# Patient Record
Sex: FEMALE | Race: WHITE | NOT HISPANIC OR LATINO | Employment: FULL TIME | URBAN - NONMETROPOLITAN AREA
[De-identification: names, ages, dates, MRNs, and addresses within clinical notes are randomized per-mention and may not be internally consistent; named-entity substitution may affect disease eponyms.]

---
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
## 2023-10-14 ENCOUNTER — HOSPITAL ENCOUNTER (EMERGENCY)
Facility: HOSPITAL | Age: 18
Discharge: HOME | End: 2023-10-14
Attending: EMERGENCY MEDICINE
Payer: COMMERCIAL

## 2023-10-14 ENCOUNTER — APPOINTMENT (OUTPATIENT)
Dept: ULTRASOUND IMAGING | Facility: HOSPITAL | Age: 18
End: 2023-10-14
Payer: COMMERCIAL

## 2023-10-14 VITALS
OXYGEN SATURATION: 97 % | BODY MASS INDEX: 32.96 KG/M2 | RESPIRATION RATE: 18 BRPM | HEART RATE: 76 BPM | HEIGHT: 63 IN | TEMPERATURE: 98 F | SYSTOLIC BLOOD PRESSURE: 115 MMHG | DIASTOLIC BLOOD PRESSURE: 76 MMHG | WEIGHT: 186 LBS

## 2023-10-14 DIAGNOSIS — R10.9 ABDOMINAL CRAMPING: ICD-10-CM

## 2023-10-14 DIAGNOSIS — Z34.90 INTRAUTERINE PREGNANCY: Primary | ICD-10-CM

## 2023-10-14 LAB
ALBUMIN SERPL-MCNC: 4.2 G/DL (ref 3.5–5.2)
ALBUMIN/GLOB SERPL: 1.4 G/DL
ALP SERPL-CCNC: 71 U/L (ref 39–117)
ALT SERPL W P-5'-P-CCNC: 18 U/L (ref 1–33)
ANION GAP SERPL CALCULATED.3IONS-SCNC: 12.7 MMOL/L (ref 5–15)
AST SERPL-CCNC: 17 U/L (ref 1–32)
B-HCG UR QL: NEGATIVE
BASOPHILS # BLD AUTO: 0.02 10*3/MM3 (ref 0–0.2)
BASOPHILS NFR BLD AUTO: 0.2 % (ref 0–1.5)
BILIRUB SERPL-MCNC: 1.1 MG/DL (ref 0–1.2)
BILIRUB UR QL STRIP: NEGATIVE
BUN SERPL-MCNC: 11 MG/DL (ref 6–20)
BUN/CREAT SERPL: 16.9 (ref 7–25)
CALCIUM SPEC-SCNC: 9.7 MG/DL (ref 8.6–10.5)
CHLORIDE SERPL-SCNC: 103 MMOL/L (ref 98–107)
CLARITY UR: ABNORMAL
CO2 SERPL-SCNC: 22.3 MMOL/L (ref 22–29)
COLOR UR: YELLOW
CREAT SERPL-MCNC: 0.65 MG/DL (ref 0.57–1)
DEPRECATED RDW RBC AUTO: 38.5 FL (ref 37–54)
EGFRCR SERPLBLD CKD-EPI 2021: 130.3 ML/MIN/1.73
EOSINOPHIL # BLD AUTO: 0.18 10*3/MM3 (ref 0–0.4)
EOSINOPHIL NFR BLD AUTO: 1.8 % (ref 0.3–6.2)
ERYTHROCYTE [DISTWIDTH] IN BLOOD BY AUTOMATED COUNT: 12 % (ref 12.3–15.4)
GLOBULIN UR ELPH-MCNC: 3 GM/DL
GLUCOSE SERPL-MCNC: 87 MG/DL (ref 65–99)
GLUCOSE UR STRIP-MCNC: NEGATIVE MG/DL
HCG INTACT+B SERPL-ACNC: 3092 MIU/ML
HCT VFR BLD AUTO: 39.3 % (ref 34–46.6)
HGB BLD-MCNC: 13.1 G/DL (ref 12–15.9)
HGB UR QL STRIP.AUTO: NEGATIVE
IMM GRANULOCYTES # BLD AUTO: 0.03 10*3/MM3 (ref 0–0.05)
IMM GRANULOCYTES NFR BLD AUTO: 0.3 % (ref 0–0.5)
KETONES UR QL STRIP: NEGATIVE
LEUKOCYTE ESTERASE UR QL STRIP.AUTO: NEGATIVE
LYMPHOCYTES # BLD AUTO: 2.81 10*3/MM3 (ref 0.7–3.1)
LYMPHOCYTES NFR BLD AUTO: 28.6 % (ref 19.6–45.3)
MCH RBC QN AUTO: 29.4 PG (ref 26.6–33)
MCHC RBC AUTO-ENTMCNC: 33.3 G/DL (ref 31.5–35.7)
MCV RBC AUTO: 88.1 FL (ref 79–97)
MONOCYTES # BLD AUTO: 0.56 10*3/MM3 (ref 0.1–0.9)
MONOCYTES NFR BLD AUTO: 5.7 % (ref 5–12)
NEUTROPHILS NFR BLD AUTO: 6.21 10*3/MM3 (ref 1.7–7)
NEUTROPHILS NFR BLD AUTO: 63.4 % (ref 42.7–76)
NITRITE UR QL STRIP: NEGATIVE
NRBC BLD AUTO-RTO: 0 /100 WBC (ref 0–0.2)
PH UR STRIP.AUTO: 7 [PH] (ref 5–8)
PLATELET # BLD AUTO: 304 10*3/MM3 (ref 140–450)
PMV BLD AUTO: 9.5 FL (ref 6–12)
POTASSIUM SERPL-SCNC: 3.7 MMOL/L (ref 3.5–5.2)
PROT SERPL-MCNC: 7.2 G/DL (ref 6–8.5)
PROT UR QL STRIP: NEGATIVE
RBC # BLD AUTO: 4.46 10*6/MM3 (ref 3.77–5.28)
SODIUM SERPL-SCNC: 138 MMOL/L (ref 136–145)
SP GR UR STRIP: 1.03 (ref 1–1.03)
UROBILINOGEN UR QL STRIP: ABNORMAL
WBC NRBC COR # BLD: 9.81 10*3/MM3 (ref 3.4–10.8)

## 2023-10-14 PROCEDURE — 81003 URINALYSIS AUTO W/O SCOPE: CPT | Performed by: PHYSICIAN ASSISTANT

## 2023-10-14 PROCEDURE — 81025 URINE PREGNANCY TEST: CPT | Performed by: PHYSICIAN ASSISTANT

## 2023-10-14 PROCEDURE — 99284 EMERGENCY DEPT VISIT MOD MDM: CPT

## 2023-10-14 PROCEDURE — 85025 COMPLETE CBC W/AUTO DIFF WBC: CPT | Performed by: PHYSICIAN ASSISTANT

## 2023-10-14 PROCEDURE — 76817 TRANSVAGINAL US OBSTETRIC: CPT

## 2023-10-14 PROCEDURE — 84702 CHORIONIC GONADOTROPIN TEST: CPT | Performed by: PHYSICIAN ASSISTANT

## 2023-10-14 PROCEDURE — 36415 COLL VENOUS BLD VENIPUNCTURE: CPT

## 2023-10-14 PROCEDURE — 80053 COMPREHEN METABOLIC PANEL: CPT | Performed by: PHYSICIAN ASSISTANT

## 2023-10-14 RX ORDER — PRENATAL VIT NO.126/IRON/FOLIC 28MG-0.8MG
1 TABLET ORAL DAILY
COMMUNITY

## 2023-10-14 NOTE — ED PROVIDER NOTES
"Subjective  History of Present Illness:    Chief Complaint: Abdominal Cramping   History of Present Illness: Patient is a 17-year-old G1, P0 female who believes she may be approximately 6 weeks gestation with history of asthma and Kathya-Parkinson-White syndrome presenting to the ER for evaluation of abdominal cramping.  Patient reports her last period was September 12, 2023, had blood work performed that showed that she may be around 6 weeks pregnant.  She states for the past couple of days she has had diffuse lower abdominal cramping without vaginal bleeding.  She denies any fever, chills, vaginal discharge, dysuria, hematuria, diarrhea or change in bowel movements, dizziness, syncope, or any other symptoms.  She has not seen OB/GYN with this pregnancy yet.  Onset: Few days  Duration: Persistent   Exacerbating / Alleviating factors: None  Associated symptoms: None      Nurses Notes reviewed and agree, including vitals, allergies, social history and prior medical history.     REVIEW OF SYSTEMS: All systems reviewed and not pertinent unless noted.  Review of Systems      Positive for: Abdominal cramping     Negative for: Vaginal bleeding, fever, chills, dysuria, hematuria, dizziness, syncope    Past Medical History:   Diagnosis Date    Asthma     Kathya-Parkinson-White syndrome        Allergies:    Patient has no known allergies.      History reviewed. No pertinent surgical history.      Social History     Socioeconomic History    Marital status: Single   Tobacco Use    Smoking status: Never   Substance and Sexual Activity    Alcohol use: Never    Drug use: Never         History reviewed. No pertinent family history.    Objective  Physical Exam:  /76 (BP Location: Left arm, Patient Position: Sitting)   Pulse 76   Temp 98 øF (36.7 øC) (Oral)   Resp 18   Ht 160 cm (63\")   Wt 84.4 kg (186 lb)   LMP 09/13/2023 (Exact Date)   SpO2 97%   BMI 32.95 kg/mý      Physical Exam    CONSTITUTIONAL: Well developed, no " acute distress, nontoxic in appearance  VITAL SIGNS: per nursing, reviewed and noted  SKIN: exposed skin with no rashes, ulcerations or petechiae  EYES: Grossly EOMI, no icterus, PERRL  ENT: Normal voice.  Nares patent, no facial asymmetry   RESPIRATORY:  No increased work of breathing. No retractions. Lung sounds clear to auscultation   CARDIOVASCULAR:  regular rate and rhythm, distal pulses intact  GI: Abdomen soft, no significant tenderness or guarding with palpation  MUSCULOSKELETAL:  No tenderness. Full ROM. Strength and tone grossly normal.  no spasms.  NEUROLOGIC: Alert, oriented x 3. No gross deficits. GCS 15.   PSYCH: appropriate affect.  : Deferred    Procedures    ED Course:        ED Course as of 10/14/23 2248   Sat Oct 14, 2023   1541 WBC: 9.81 [LR]   1541 Hemoglobin: 13.1 [LR]   1554 HCG, Urine QL: Negative [LR]   1555 Glucose: 87 [LR]   1555 BUN: 11 [LR]   1555 Creatinine: 0.65 [LR]   1555 Sodium: 138 [LR]   1555 Potassium: 3.7 [LR]   1555 Chloride: 103 [LR]   1555 Calcium: 9.7 [LR]   1555 Total Protein: 7.2 [LR]   1555 Albumin: 4.2 [LR]   1555 ALT (SGPT): 18 [LR]   1555 AST (SGOT): 17 [LR]   1555 Alkaline Phosphatase: 71 [LR]   1555 Total Bilirubin: 1.1 [LR]   1555 Globulin: 3.0 [LR]   1555 A/G Ratio: 1.4 [LR]   1555 BUN/Creatinine Ratio: 16.9 [LR]   1555 Anion Gap: 12.7 [LR]   1555 eGFR: 130.3 [LR]   1555 Color, UA: Yellow [LR]   1555 Appearance, UA(!): Cloudy [LR]   1555 pH, UA: 7.0 [LR]   1555 Specific Gravity, UA: 1.027 [LR]   1555 Glucose: Negative [LR]   1555 Ketones, UA: Negative [LR]   1555 Bilirubin, UA: Negative [LR]   1555 Blood, UA: Negative [LR]   1555 Protein, UA: Negative [LR]   1555 Leukocytes, UA: Negative [LR]   1555 Nitrite, UA: Negative [LR]   1555 Urobilinogen, UA: 1.0 E.U./dL [LR]   1659 HCG Quantitative: 3,092.00 [LR]   1848 Ultrasound was read by radiologist as 4 weeks 6-day intrauterine gestation [LR]      ED Course User Index  [LR] Siena Lopez PA-C       Lab Results  (last 24 hours)       Procedure Component Value Units Date/Time    CBC & Differential [[id number]]  (Abnormal) Collected: 10/14/23 1532    Specimen: Blood Updated: 10/14/23 1538    Narrative:      The following orders were created for panel order CBC & Differential.  Procedure                               Abnormality         Status                     ---------                               -----------         ------                     CBC Auto Differential[[id number]]        Abnormal            Final result                 Please view results for these tests on the individual orders.    Comprehensive Metabolic Panel [[id number]] Collected: 10/14/23 1532    Specimen: Blood Updated: 10/14/23 1553     Glucose 87 mg/dL      BUN 11 mg/dL      Creatinine 0.65 mg/dL      Sodium 138 mmol/L      Potassium 3.7 mmol/L      Chloride 103 mmol/L      CO2 22.3 mmol/L      Calcium 9.7 mg/dL      Total Protein 7.2 g/dL      Albumin 4.2 g/dL      ALT (SGPT) 18 U/L      AST (SGOT) 17 U/L      Alkaline Phosphatase 71 U/L      Total Bilirubin 1.1 mg/dL      Globulin 3.0 gm/dL      A/G Ratio 1.4 g/dL      BUN/Creatinine Ratio 16.9     Anion Gap 12.7 mmol/L      eGFR 130.3 mL/min/1.73     Narrative:      GFR Normal >60  Chronic Kidney Disease <60  Kidney Failure <15      hCG, Quantitative, Pregnancy [[id number]] Collected: 10/14/23 1532    Specimen: Blood Updated: 10/14/23 1658     HCG Quantitative 3,092.00 mIU/mL     Narrative:      HCG Ranges by Gestational Age    Females - non-pregnant premenopausal   </= 1mIU/mL HCG  Females - postmenopausal               </= 7mIU/mL HCG    3 Weeks         5.8 -    71.2 mIU/mL  4 Weeks         9.5 -     750 mIU/mL  5 Weeks         217 -   7,138 mIU/mL  6 Weeks         158 -  31,795 mIU/mL  7 Weeks       3,697 - 163,563 mIU/mL  8 Weeks      32,065 - 149,571 mIU/mL  9 Weeks      63,803 - 151,410 mIU/mL  10 Weeks     46,509 - 186,977 mIU/mL  12 Weeks     27,832 - 210,612 mIU/mL  14 Weeks     13,950 -   62,530 mIU/mL  15 Weeks     12,039 -  70,971 mIU/mL  16 Weeks      9,040 -  56,451 mIU/mL  17 Weeks      8,175 -  55,868 mIU/mL  18 Weeks      8,099 -  58,176 mIU/mL    CBC Auto Differential [[id number]]  (Abnormal) Collected: 10/14/23 1532    Specimen: Blood Updated: 10/14/23 1538     WBC 9.81 10*3/mm3      RBC 4.46 10*6/mm3      Hemoglobin 13.1 g/dL      Hematocrit 39.3 %      MCV 88.1 fL      MCH 29.4 pg      MCHC 33.3 g/dL      RDW 12.0 %      RDW-SD 38.5 fl      MPV 9.5 fL      Platelets 304 10*3/mm3      Neutrophil % 63.4 %      Lymphocyte % 28.6 %      Monocyte % 5.7 %      Eosinophil % 1.8 %      Basophil % 0.2 %      Immature Grans % 0.3 %      Neutrophils, Absolute 6.21 10*3/mm3      Lymphocytes, Absolute 2.81 10*3/mm3      Monocytes, Absolute 0.56 10*3/mm3      Eosinophils, Absolute 0.18 10*3/mm3      Basophils, Absolute 0.02 10*3/mm3      Immature Grans, Absolute 0.03 10*3/mm3      nRBC 0.0 /100 WBC     Pregnancy, Urine - Urine, Clean Catch [[id number]]  (Normal) Collected: 10/14/23 1538    Specimen: Urine, Clean Catch Updated: 10/14/23 1548     HCG, Urine QL Negative    Urinalysis With Microscopic If Indicated (No Culture) - Urine, Clean Catch [[id number]]  (Abnormal) Collected: 10/14/23 1538    Specimen: Urine, Clean Catch Updated: 10/14/23 1545     Color, UA Yellow     Appearance, UA Cloudy     pH, UA 7.0     Specific Gravity, UA 1.027     Glucose, UA Negative     Ketones, UA Negative     Bilirubin, UA Negative     Blood, UA Negative     Protein, UA Negative     Leuk Esterase, UA Negative     Nitrite, UA Negative     Urobilinogen, UA 1.0 E.U./dL    Narrative:      Urine microscopic not indicated.             US Ob Transvaginal    Result Date: 10/14/2023  FINAL REPORT TECHNIQUE: Sonographic images of the pelvis were obtained transvaginally. CLINICAL HISTORY: Abdominal cramping, positive hCG test FINDINGS: There is an intrauterine gestational sac with a mean sac diameter of 4 mm.  There is a fetal pole  with a crown-rump length measuring 1.3 mm corresponding to a gestational age of 4 weeks, 6 days.  No fetal cardiac activity is visualized.  There is a physiologic volume of free fluid in the pelvis.  There are no suspicious adnexal lesions.     Impression: 4 weeks, 6 days intrauterine gestation.  Lack of fetal cardiac activity is likely secondary to very early pregnancy.  Continued follow-up is clinically warranted. Authenticated and Electronically Signed by Cody Conklin MD on 10/14/2023 07:02:12 PM        [facility]     Amount and/or Complexity of Data Reviewed  Clinical lab tests: reviewed        Initial impression of presenting illness: Patient is a  female who believes she may be about 6 weeks gestation presenting to the ER for evaluation of abdominal cramping    DDX: includes but is not limited to: Intrauterine pregnancy, ectopic pregnancy, urinary tract infection, and other concerns.    Patient arrives in overall stable condition with vitals interpreted by myself.     Pertinent features from physical exam: Vital signs are stable, no significant tenderness or guarding on exam    Initial diagnostic plan: We will obtain urinalysis, UPT, hCG level and basic labs.    Results from initial plan were reviewed and interpreted by me revealing Stable work-up.  UPT was negative but patient's hCG level was 3092.  Urine had no signs of infection.  CBC stable, CMP without abnormalities.  Ultrasound was read by radiologist as a 4-week 6-day intrauterine gestation    Diagnostic information from other sources: Chart review    Interventions / Re-evaluation: Patient remained stable throughout visit    Results/clinical rationale were discussed with patient.  Discussed that she will need continued follow-up with OB/GYN.  Discussed return precautions to the ER.  She verbalized understanding and was in agreement with this plan of care    Consultations/Discussion of results with other physicians: None    Disposition plan:  Discharge  -----    Final diagnoses:   Intrauterine pregnancy   Abdominal cramping          Siena Lopez PA-C  10/14/23 0722

## 2023-10-14 NOTE — DISCHARGE INSTRUCTIONS
Your blood work and urine were stable.  Ultrasound revealed an intrauterine gestation approximately 4 weeks 6 days, no cardiac activity identified at this time but likely related to the early gestation.  You will need to follow-up closely with your primary care provider and OB/GYN as scheduled.  You can take Tylenol as needed for pain, continue prenatal vitamins.  Avoid NSAIDs such as ibuprofen, Motrin or Aleve.  Return to the ER for any change in worsening symptoms, or any additional concerns including but not limited to severe or worsening abdominal pain, heavy vaginal bleeding with dizziness or syncope, fever greater than 100.4.

## 2023-11-17 ENCOUNTER — INITIAL PRENATAL (OUTPATIENT)
Dept: OBSTETRICS AND GYNECOLOGY | Facility: CLINIC | Age: 18
End: 2023-11-17
Payer: COMMERCIAL

## 2023-11-17 VITALS — BODY MASS INDEX: 32.52 KG/M2 | SYSTOLIC BLOOD PRESSURE: 118 MMHG | DIASTOLIC BLOOD PRESSURE: 68 MMHG | WEIGHT: 183.6 LBS

## 2023-11-17 DIAGNOSIS — Z34.90 PREGNANCY, UNSPECIFIED GESTATIONAL AGE: Primary | ICD-10-CM

## 2023-11-17 RX ORDER — EPINEPHRINE 0.3 MG/.3ML
INJECTION SUBCUTANEOUS
COMMUNITY
Start: 2023-08-14

## 2023-11-17 NOTE — PROGRESS NOTES
Subjective   Chief Complaint   Patient presents with    Initial Prenatal Visit     New OB visit with TVS,UDS and cultures done today. No problems or concerns.     Jaye Simon is a 18 y.o. year old .  Patient's last menstrual period was 2023 (exact date).  She presents to be seen because of NOB.     OTHER COMPLAINTS:  WPW-- ablation in [place] this past year    The following portions of the patient's history were reviewed and updated as appropriate:She  has a past medical history of Abnormal ECG, Anxiety, Asthma, Coronary artery disease, Depression (2019), PMS (premenstrual syndrome), PONV (postoperative nausea and vomiting), Urinary tract infection, and Kathya-Parkinson-White syndrome.  She does not have a problem list on file.  She  has a past surgical history that includes [facility] tooth extraction (21).  Her family history includes Breast cancer in her maternal grandmother; Diabetes in her maternal grandfather; Hypertension in her father; Ovarian cancer in her maternal grandmother; Prostate cancer in her father; Stroke in her sister; Uterine cancer in her maternal aunt.  She  reports that she has quit smoking. Her smoking use included cigarettes. She has a 0.13 pack-year smoking history. She does not have any smokeless tobacco history on file. She reports that she does not currently use alcohol. She reports that she does not use drugs.  Current Outpatient Medications   Medication Sig Dispense Refill    EPINEPHrine (EPIPEN) 0.3 MG/0.3ML solution auto-injector injection AS DIRECTED FOR anaphylaxis      prenatal vitamin (prenatal, CLASSIC, vitamin) tablet Take 1 tablet by mouth Daily.      FLUoxetine (PROzac) 10 MG capsule Take 1 capsule by mouth Daily.      fluticasone (FLONASE) 50 MCG/ACT nasal spray 2 sprays into the nostril(s) as directed by provider Daily. 15.8 mL 0    ondansetron ODT (ZOFRAN-ODT) 4 MG disintegrating tablet Place 1 tablet on the tongue Every 6 (Six) Hours As Needed for Nausea or  Vomiting. 20 tablet 0     No current facility-administered medications for this visit.     Current Outpatient Medications on File Prior to Visit   Medication Sig    EPINEPHrine (EPIPEN) 0.3 MG/0.3ML solution auto-injector injection AS DIRECTED FOR anaphylaxis    prenatal vitamin (prenatal, CLASSIC, vitamin) tablet Take 1 tablet by mouth Daily.    FLUoxetine (PROzac) 10 MG capsule Take 1 capsule by mouth Daily.    fluticasone (FLONASE) 50 MCG/ACT nasal spray 2 sprays into the nostril(s) as directed by provider Daily.    ondansetron ODT (ZOFRAN-ODT) 4 MG disintegrating tablet Place 1 tablet on the tongue Every 6 (Six) Hours As Needed for Nausea or Vomiting.     No current facility-administered medications on file prior to visit.     She has No Known Allergies.    Social History    Tobacco Use      Smoking status: Former        Packs/day: 0.25        Years: 0.50        Additional pack years: 0.00        Total pack years: 0.13        Types: Cigarettes      Smokeless tobacco: Not on file    Review of Systems  Consitutional POS: nothing reported    NEG: anorexia or night sweats   Gastointestinal POS: nothing reported    NEG: bloating, change in bowel habits, melena, or reflux symptoms   Genitourinary POS: nothing reported    NEG: dysuria or hematuria   Integument POS: nothing reported    NEG: moles that are changing in size, shape, color or rashes   Breast POS: nothing reported    NEG: persistent breast lump, skin dimpling, or nipple discharge         Respiratory: negative  Cardiovascular: negative  GYN:  negative          Objective   /68   Wt 83.3 kg (183 lb 9.6 oz)   LMP 09/13/2023 (Exact Date)   BMI 32.52 kg/m²     General:  well developed; well nourished  no acute distress   Skin:  No suspicious lesions seen   Thyroid: normal to inspection and palpation   Lungs:  breathing is unlabored  clear to auscultation bilaterally   Heart:  regular rate and rhythm, S1, S2 normal, no murmur, click, rub or gallop    Breasts:  Examined in supine position  Symmetric without masses or skin dimpling  Nipples normal without inversion, lesions or discharge  There are no palpable axillary nodes   Abdomen: soft, non-tender; no masses  no umbilical or inguinal hernias are present  no hepato-splenomegaly   Pelvis: Clinical staff was present for exam  External genitalia:  normal appearance of the external genitalia including Bartholin's and Tallulah's glands.  :  urethral meatus normal;  Vaginal:  normal pink mucosa without prolapse or lesions.  Cervix:  normal appearance.  Uterus:  normal size, shape and consistency.  Adnexa:  normal bimanual exam of the adnexa.  Rectal:  digital rectal exam not performed; anus visually normal appearing.     Psychiatric: Alert and oriented ×3, mood and affect appropriate  HEENT: Atraumatic, normocephalic, normal scleral icterus  Extremities: 2+ pulses bilaterally, no edema      Lab Review   No data reviewed    Imaging   Viable intrauterine pregnancy 9 weeks 5 days.  Positive cardiac activity.  Normal cervix and adnexa.  No masses.  No free fluid.       Assessment   New OB 9 weeks 2 days.  Size consistent with dates.  DECLAN will be 6/19/2024     Plan   Cultures taken.  Prenatal labs.  Cystic fibrosis testing.  Encourage patient to follow-up at least sometime in this.  Echo was normal last fall  Diet/exercise    No orders of the defined types were placed in this encounter.         This note was electronically signed.      November 17, 2023

## 2023-11-18 LAB
AMPHETAMINES UR QL SCN: NEGATIVE NG/ML
BARBITURATES UR QL SCN: NEGATIVE NG/ML
BENZODIAZ UR QL SCN: NEGATIVE NG/ML
BZE UR QL SCN: NEGATIVE NG/ML
CANNABINOIDS UR QL SCN: NEGATIVE NG/ML
CREAT UR-MCNC: 206.2 MG/DL (ref 20–300)
LABORATORY COMMENT REPORT: NORMAL
METHADONE UR QL SCN: NEGATIVE NG/ML
OPIATES UR QL SCN: NEGATIVE NG/ML
OXYCODONE+OXYMORPHONE UR QL SCN: NEGATIVE NG/ML
PCP UR QL: NEGATIVE NG/ML
PH UR: 6 [PH] (ref 4.5–8.9)
PROPOXYPH UR QL SCN: NEGATIVE NG/ML

## 2023-11-21 LAB
A VAGINAE DNA VAG QL NAA+PROBE: ABNORMAL SCORE
BVAB2 DNA VAG QL NAA+PROBE: ABNORMAL SCORE
C ALBICANS DNA VAG QL NAA+PROBE: NEGATIVE
C GLABRATA DNA VAG QL NAA+PROBE: NEGATIVE
C TRACH DNA VAG QL NAA+PROBE: NEGATIVE
MEGA1 DNA VAG QL NAA+PROBE: ABNORMAL SCORE
N GONORRHOEA DNA VAG QL NAA+PROBE: NEGATIVE
T VAGINALIS DNA VAG QL NAA+PROBE: POSITIVE

## 2023-11-21 RX ORDER — METRONIDAZOLE 500 MG/1
500 TABLET ORAL 2 TIMES DAILY
Qty: 14 TABLET | Refills: 0 | Status: SHIPPED | OUTPATIENT
Start: 2023-11-21 | End: 2023-11-28

## 2023-11-28 LAB
ABO GROUP BLD: NORMAL
BASOPHILS # BLD AUTO: 0 X10E3/UL (ref 0–0.2)
BASOPHILS NFR BLD AUTO: 1 %
BLD GP AB SCN SERPL QL: NEGATIVE
CFTR MUT ANL BLD/T: NORMAL
EOSINOPHIL # BLD AUTO: 0.1 X10E3/UL (ref 0–0.4)
EOSINOPHIL NFR BLD AUTO: 1 %
ERYTHROCYTE [DISTWIDTH] IN BLOOD BY AUTOMATED COUNT: 12.2 % (ref 11.7–15.4)
HBV SURFACE AG SERPL QL IA: NEGATIVE
HCT VFR BLD AUTO: 36.9 % (ref 34–46.6)
HCV IGG SERPL QL IA: NON REACTIVE
HGB BLD-MCNC: 12.3 G/DL (ref 11.1–15.9)
HIV 1+2 AB+HIV1 P24 AG SERPL QL IA: NON REACTIVE
IMM GRANULOCYTES # BLD AUTO: 0 X10E3/UL (ref 0–0.1)
IMM GRANULOCYTES NFR BLD AUTO: 0 %
LABORATORY COMMENT REPORT: NORMAL
LYMPHOCYTES # BLD AUTO: 2.2 X10E3/UL (ref 0.7–3.1)
LYMPHOCYTES NFR BLD AUTO: 29 %
MCH RBC QN AUTO: 29.6 PG (ref 26.6–33)
MCHC RBC AUTO-ENTMCNC: 33.3 G/DL (ref 31.5–35.7)
MCV RBC AUTO: 89 FL (ref 79–97)
MONOCYTES # BLD AUTO: 0.4 X10E3/UL (ref 0.1–0.9)
MONOCYTES NFR BLD AUTO: 5 %
NEUTROPHILS # BLD AUTO: 4.7 X10E3/UL (ref 1.4–7)
NEUTROPHILS NFR BLD AUTO: 64 %
PLATELET # BLD AUTO: 280 X10E3/UL (ref 150–450)
RBC # BLD AUTO: 4.15 X10E6/UL (ref 3.77–5.28)
RH BLD: POSITIVE
RPR SER QL: NON REACTIVE
RUBV IGG SERPL IA-ACNC: 3.76 INDEX
WBC # BLD AUTO: 7.4 X10E3/UL (ref 3.4–10.8)

## 2023-12-18 ENCOUNTER — REFERRAL TRIAGE (OUTPATIENT)
Dept: LABOR AND DELIVERY | Facility: HOSPITAL | Age: 18
End: 2023-12-18
Payer: COMMERCIAL

## 2023-12-18 ENCOUNTER — ROUTINE PRENATAL (OUTPATIENT)
Dept: OBSTETRICS AND GYNECOLOGY | Facility: CLINIC | Age: 18
End: 2023-12-18
Payer: COMMERCIAL

## 2023-12-18 VITALS — DIASTOLIC BLOOD PRESSURE: 78 MMHG | BODY MASS INDEX: 31.89 KG/M2 | SYSTOLIC BLOOD PRESSURE: 120 MMHG | WEIGHT: 180 LBS

## 2023-12-18 DIAGNOSIS — A59.9 TRICHOMONAS INFECTION: ICD-10-CM

## 2023-12-18 DIAGNOSIS — Z34.92 SECOND TRIMESTER PREGNANCY: Primary | ICD-10-CM

## 2023-12-18 NOTE — PROGRESS NOTES
Chief Complaint   Patient presents with    Routine Prenatal Visit     Prenatal visit with no problems or concerns. Patient needs a Test of cure done today.        HPI:   , 14w1d gestation reports doing well    ROS:  See Prenatal Episode/Flowsheet  /78   Wt 81.6 kg (180 lb)   LMP 2023 (Exact Date)   BMI 31.89 kg/m²      EXAM:  EXTREMITIES:  No swelling-See Prenatal Episode/Flowsheet    ABDOMEN:  FHTs/Movement noted-See Prenatal Episode/Flowsheet    URINE GLUCOSE/PROTEIN:  See Prenatal Episode/Flowsheet    PELVIC EXAM:  See Prenatal Episode/Flowsheet  CV:  Lungs:  GYN:    MDM:    Lab Results   Component Value Date    HGB 12.3 2023    RUBELLAABIGG 3.76 2023    HEPBSAG Negative 2023    ABO O 2023    RH Positive 2023    ABSCRN Negative 2023    [id number] Non Reactive 2023    HEPCVIRUSABY Non Reactive 2023    URINECX 25,000 CFU/mL Normal Urogenital Celena 2022       U/S:US Ob Transvaginal (2023 08:41)     1. IUP 14w1d  2. Routine care   3. H/O Trich--VARGHESE done today, finished abx

## 2023-12-20 LAB
A VAGINAE DNA VAG QL NAA+PROBE: NORMAL SCORE
BVAB2 DNA VAG QL NAA+PROBE: NORMAL SCORE
C ALBICANS DNA VAG QL NAA+PROBE: NEGATIVE
C GLABRATA DNA VAG QL NAA+PROBE: NEGATIVE
C TRACH DNA VAG QL NAA+PROBE: NEGATIVE
MEGA1 DNA VAG QL NAA+PROBE: NORMAL SCORE
N GONORRHOEA DNA VAG QL NAA+PROBE: NEGATIVE
T VAGINALIS DNA VAG QL NAA+PROBE: NEGATIVE

## 2024-01-15 ENCOUNTER — PATIENT OUTREACH (OUTPATIENT)
Dept: OBSTETRICS AND GYNECOLOGY | Facility: HOSPITAL | Age: 19
End: 2024-01-15
Payer: COMMERCIAL

## 2024-01-15 ENCOUNTER — ROUTINE PRENATAL (OUTPATIENT)
Dept: OBSTETRICS AND GYNECOLOGY | Facility: CLINIC | Age: 19
End: 2024-01-15
Payer: COMMERCIAL

## 2024-01-15 VITALS — SYSTOLIC BLOOD PRESSURE: 116 MMHG | WEIGHT: 178 LBS | DIASTOLIC BLOOD PRESSURE: 60 MMHG | BODY MASS INDEX: 31.53 KG/M2

## 2024-01-15 DIAGNOSIS — O21.9 NAUSEA AND VOMITING DURING PREGNANCY PRIOR TO 22 WEEKS GESTATION: ICD-10-CM

## 2024-01-15 DIAGNOSIS — Z36.89 ENCOUNTER FOR FETAL ANATOMIC SURVEY: ICD-10-CM

## 2024-01-15 DIAGNOSIS — O09.92 ENCOUNTER FOR SUPERVISION OF HIGH RISK PREGNANCY IN SECOND TRIMESTER, ANTEPARTUM: Primary | ICD-10-CM

## 2024-01-15 DIAGNOSIS — K21.9 GASTROESOPHAGEAL REFLUX DISEASE WITHOUT ESOPHAGITIS: ICD-10-CM

## 2024-01-15 PROCEDURE — 99214 OFFICE O/P EST MOD 30 MIN: CPT | Performed by: OBSTETRICS & GYNECOLOGY

## 2024-01-15 RX ORDER — FAMOTIDINE 20 MG/1
20 TABLET, FILM COATED ORAL 2 TIMES DAILY
Qty: 60 TABLET | Refills: 5 | Status: SHIPPED | OUTPATIENT
Start: 2024-01-15

## 2024-01-15 NOTE — PROGRESS NOTES
Chief Complaint  Routine Prenatal Visit (Anatomy scan today, no complaints. )    History of Present Illness:  Jaye is a  currently at 18w1d who presents today with complaints of nausea and occasional emesis.  Patient also reports having severe reflux.  She denies any vaginal bleeding or spotting.  She did have previous labs and cultures last visit.  She has not felt fetal movement at present.  She did not have previous genetic screening.    Exam:  Vitals:  See prenatal flowsheet as noted and reviewed  General: Alert, cooperative, and does not appear in any distress  Abdomen:   See prenatal flowsheet as noted and reviewed    Uterus gravid, non-tender; no palpable masses    No guarding or rebound tenderness  Pelvic:  See prenatal flowsheet as noted and reviewed  Ext:  See prenatal flowsheet as noted and reviewed    Moves extremities well, no cyanosis and no redness  Urine:  See prenatal flowsheet as noted and reviewed    Data Review:  The following data was reviewed by: Daphne Tamayo MD on 01/15/2024:  Prenatal Labs:  Lab Results   Component Value Date    HGB 12.3 2023    RUBELLAABIGG 3.76 2023    HEPBSAG Negative 2023    ABO O 2023    RH Positive 2023    ABSCRN Negative 2023    EDV2PKB0 Non Reactive 2023    HEPCVIRUSABY Non Reactive 2023    URINECX 25,000 CFU/mL Normal Urogenital Celena 2022       Routine Prenatal on 2023   Component Date Value    Atopobium Vaginae 2023 Low - 0     BVAB 2 2023 Low - 0     Megasphaera 1 2023 Low - 0     Micki Albicans, MARK 2023 Negative     Micki Glabrata, MARK 2023 Negative     Trichomonas vaginosis 2023 Negative     Chlamydia trachomatis, N* 2023 Negative     Neisseria gonorrhoeae, N* 2023 Negative      Imaging:  US Ob 14 + Weeks Single or First Gestation  Jaye Simon  : 2005  MRN: [id number]  Date: 1/15/2024    Reason for exam/History:  Anatomic  Survey    Ultrasound images are reviewed.  There is noted to be a viable   intrauterine pregnancy.   The pregnancy is measuring 18 weeks 2 days   gestation.  The fetal heart rate was normal.  Normal anatomy was not   noted.  The cardiac outflow tracts were not visualized secondary to fetal   positioning.  The placental location was noted to be anterior.  The   amniotic fluid was normal.    The exam limitations noted:  none    See the official report for actual measurements and structures seen.    Daphne Tamayo MD, [facility]  OB GYN [place]      Medical Records:  None    Assessment and Plan:  Problem List Items Addressed This Visit    None  Visit Diagnoses       Encounter for supervision of high risk pregnancy in second trimester, antepartum    -  Primary  Topics discussed:     ab precautions  genetic screening - Today we discussed genetic testing.  She is aware that the MSAFP-4 is a screening test.  A screening test is not a diagnostic test.  This means that a negative test does not guarantee an unaffected fetus and a positive test does not mean the fetus has the condition for which the test is being performed.  If the test returns positive, a diagnostic test should be consider to determine if the fetus in fact has the condition.  After considering the options previously presented, she is interested in having genetic testing performed.  GERD management  kick counts and fetal movement  PIH precautions  Anatomic scan today as noted.  MSAFP.    Relevant Orders    US Ob 14 + Weeks Single or First Gestation (Completed)    Alpha Fetoprotein, Maternal    US Ob Limited 1 + Fetuses    Encounter for fetal anatomic survey      Patient informed regarding the findings.  Follow-up scan next visit as discussed.    Relevant Orders    US Ob 14 + Weeks Single or First Gestation (Completed)    Nausea and vomiting during pregnancy prior to 22 weeks gestation      Scription is given for Pepcid as noted.   Instructions and precautions have been given.  Patient is to call if worsening and/or changes in her symptoms.    Relevant Medications    famotidine (PEPCID) 20 MG tablet    Gastroesophageal reflux disease without esophagitis      Prescription given as noted.    Relevant Medications    famotidine (PEPCID) 20 MG tablet          Follow Up/Instructions:  Follow up as scheduled.  Patient was given instructions and counseling regarding her condition or for health maintenance advice. Please see specific information pulled into the AVS if appropriate.     Note: Speech recognition transcription software may have been used to dictate portions of this document.  An attempt at proofreading has been made though minor errors in transcription may still be present.    This note was electronically signed.  Daphne Tamayo M.D.

## 2024-01-17 ENCOUNTER — TELEPHONE (OUTPATIENT)
Dept: OBSTETRICS AND GYNECOLOGY | Facility: CLINIC | Age: 19
End: 2024-01-17
Payer: COMMERCIAL

## 2024-01-17 LAB
AFP INTERP SERPL-IMP: NORMAL
AFP INTERP SERPL-IMP: NORMAL
AFP MOM SERPL: 1.67
AFP SERPL-MCNC: 66.7 NG/ML
AGE AT DELIVERY: 20.6 YR
GA METHOD: NORMAL
GA: 18.1 WEEKS
IDDM PATIENT QL: NO
LABORATORY COMMENT REPORT: NORMAL
MULTIPLE PREGNANCY: NO
NEURAL TUBE DEFECT RISK FETUS: 1757 %
RESULT: NORMAL

## 2024-01-17 NOTE — TELEPHONE ENCOUNTER
Caller: Jaye Simon    Relationship: Self    Best call back number: [phone]    What is the best time to reach you: ANYTIME     Who are you requesting to speak with (clinical staff, provider,  specific staff member): PROVIDER OR NURSE      Do you know the name of the person who called: NA    What was the call regarding: OB PT CALLING REQUESTING CALLBACK, STATES SHE WOKE UP THIS AM FEELING LIGHT HEADED AND DIZZY AND STARTED CRAMPING, SHE DID NOT HOW TO BE ADVISED. STILL EXPERIENCING THE DIZZINESS.     Is it okay if the provider responds through MyChart: NA    PLEASE ADVISE, OK TO CALLBACK ANYTIME, OK TO LEAVE A VM

## 2024-01-25 ENCOUNTER — HOSPITAL ENCOUNTER (EMERGENCY)
Facility: HOSPITAL | Age: 19
Discharge: HOME | End: 2024-01-26
Attending: EMERGENCY MEDICINE
Payer: COMMERCIAL

## 2024-01-25 DIAGNOSIS — O21.9 NAUSEA AND VOMITING DURING PREGNANCY: Primary | ICD-10-CM

## 2024-01-25 DIAGNOSIS — O99.891 ASYMPTOMATIC BACTERIURIA DURING PREGNANCY: ICD-10-CM

## 2024-01-25 DIAGNOSIS — R82.71 ASYMPTOMATIC BACTERIURIA DURING PREGNANCY: ICD-10-CM

## 2024-01-25 LAB
ALBUMIN SERPL-MCNC: 3.5 G/DL (ref 3.5–5.2)
ALBUMIN/GLOB SERPL: 1 G/DL
ALP SERPL-CCNC: 62 U/L (ref 39–117)
ALT SERPL W P-5'-P-CCNC: 12 U/L (ref 1–33)
ANION GAP SERPL CALCULATED.3IONS-SCNC: 16.2 MMOL/L (ref 5–15)
AST SERPL-CCNC: 16 U/L (ref 1–32)
BASOPHILS # BLD AUTO: 0.04 10*3/MM3 (ref 0–0.2)
BASOPHILS NFR BLD AUTO: 0.4 % (ref 0–1.5)
BILIRUB SERPL-MCNC: 0.6 MG/DL (ref 0–1.2)
BUN SERPL-MCNC: 8 MG/DL (ref 6–20)
BUN/CREAT SERPL: 19.5 (ref 7–25)
CALCIUM SPEC-SCNC: 8.7 MG/DL (ref 8.6–10.5)
CHLORIDE SERPL-SCNC: 101 MMOL/L (ref 98–107)
CO2 SERPL-SCNC: 17.8 MMOL/L (ref 22–29)
CREAT SERPL-MCNC: 0.41 MG/DL (ref 0.57–1)
DEPRECATED RDW RBC AUTO: 41.3 FL (ref 37–54)
EGFRCR SERPLBLD CKD-EPI 2021: 144.7 ML/MIN/1.73
EOSINOPHIL # BLD AUTO: 0.09 10*3/MM3 (ref 0–0.4)
EOSINOPHIL NFR BLD AUTO: 0.8 % (ref 0.3–6.2)
ERYTHROCYTE [DISTWIDTH] IN BLOOD BY AUTOMATED COUNT: 12.7 % (ref 12.3–15.4)
GLOBULIN UR ELPH-MCNC: 3.6 GM/DL
GLUCOSE SERPL-MCNC: 76 MG/DL (ref 65–99)
HCT VFR BLD AUTO: 35.5 % (ref 34–46.6)
HGB BLD-MCNC: 12.1 G/DL (ref 12–15.9)
HOLD SPECIMEN: NORMAL
HOLD SPECIMEN: NORMAL
IMM GRANULOCYTES # BLD AUTO: 0.06 10*3/MM3 (ref 0–0.05)
IMM GRANULOCYTES NFR BLD AUTO: 0.5 % (ref 0–0.5)
LYMPHOCYTES # BLD AUTO: 2.01 10*3/MM3 (ref 0.7–3.1)
LYMPHOCYTES NFR BLD AUTO: 18.4 % (ref 19.6–45.3)
MCH RBC QN AUTO: 30.4 PG (ref 26.6–33)
MCHC RBC AUTO-ENTMCNC: 34.1 G/DL (ref 31.5–35.7)
MCV RBC AUTO: 89.2 FL (ref 79–97)
MONOCYTES # BLD AUTO: 0.52 10*3/MM3 (ref 0.1–0.9)
MONOCYTES NFR BLD AUTO: 4.8 % (ref 5–12)
NEUTROPHILS NFR BLD AUTO: 75.1 % (ref 42.7–76)
NEUTROPHILS NFR BLD AUTO: 8.21 10*3/MM3 (ref 1.7–7)
NRBC BLD AUTO-RTO: 0 /100 WBC (ref 0–0.2)
PLATELET # BLD AUTO: 261 10*3/MM3 (ref 140–450)
PMV BLD AUTO: 10.5 FL (ref 6–12)
POTASSIUM SERPL-SCNC: 4 MMOL/L (ref 3.5–5.2)
PROT SERPL-MCNC: 7.1 G/DL (ref 6–8.5)
RBC # BLD AUTO: 3.98 10*6/MM3 (ref 3.77–5.28)
SODIUM SERPL-SCNC: 135 MMOL/L (ref 136–145)
WBC NRBC COR # BLD AUTO: 10.93 10*3/MM3 (ref 3.4–10.8)
WHOLE BLOOD HOLD COAG: NORMAL
WHOLE BLOOD HOLD SPECIMEN: NORMAL

## 2024-01-25 PROCEDURE — 25810000003 SODIUM CHLORIDE 0.9 % SOLUTION

## 2024-01-25 PROCEDURE — 80053 COMPREHEN METABOLIC PANEL: CPT

## 2024-01-25 PROCEDURE — 96374 THER/PROPH/DIAG INJ IV PUSH: CPT

## 2024-01-25 PROCEDURE — 99283 EMERGENCY DEPT VISIT LOW MDM: CPT

## 2024-01-25 PROCEDURE — 85025 COMPLETE CBC W/AUTO DIFF WBC: CPT

## 2024-01-25 PROCEDURE — 25010000002 DIPHENHYDRAMINE PER 50 MG

## 2024-01-25 PROCEDURE — 96361 HYDRATE IV INFUSION ADD-ON: CPT

## 2024-01-25 PROCEDURE — 25010000002 METOCLOPRAMIDE PER 10 MG

## 2024-01-25 PROCEDURE — 96375 TX/PRO/DX INJ NEW DRUG ADDON: CPT

## 2024-01-25 RX ORDER — DIPHENHYDRAMINE HYDROCHLORIDE 50 MG/ML
25 INJECTION INTRAMUSCULAR; INTRAVENOUS ONCE
Status: COMPLETED | OUTPATIENT
Start: 2024-01-26 | End: 2024-01-25

## 2024-01-25 RX ORDER — METOCLOPRAMIDE HYDROCHLORIDE 5 MG/ML
5 INJECTION INTRAMUSCULAR; INTRAVENOUS ONCE
Status: COMPLETED | OUTPATIENT
Start: 2024-01-26 | End: 2024-01-25

## 2024-01-25 RX ORDER — SODIUM CHLORIDE 0.9 % (FLUSH) 0.9 %
10 SYRINGE (ML) INJECTION AS NEEDED
Status: DISCONTINUED | OUTPATIENT
Start: 2024-01-25 | End: 2024-01-26 | Stop reason: HOSPADM

## 2024-01-25 RX ADMIN — METOCLOPRAMIDE 5 MG: 5 INJECTION, SOLUTION INTRAMUSCULAR; INTRAVENOUS at 23:53

## 2024-01-25 RX ADMIN — DIPHENHYDRAMINE HYDROCHLORIDE 25 MG: 50 INJECTION INTRAMUSCULAR; INTRAVENOUS at 23:53

## 2024-01-25 RX ADMIN — SODIUM CHLORIDE 1000 ML: 9 INJECTION, SOLUTION INTRAVENOUS at 23:55

## 2024-01-25 NOTE — Clinical Note
[facility] EMERGENCY DEPARTMENT  [address] [address]  Phone: [phone]    Jaye Simon was seen and treated in our emergency department on 1/25/2024.  She may return to work on 01/29/2024.         Thank you for choosing [facility].    Shannan Paulson MD

## 2024-01-26 VITALS
DIASTOLIC BLOOD PRESSURE: 63 MMHG | HEIGHT: 63 IN | RESPIRATION RATE: 16 BRPM | WEIGHT: 178.8 LBS | BODY MASS INDEX: 31.68 KG/M2 | SYSTOLIC BLOOD PRESSURE: 102 MMHG | OXYGEN SATURATION: 98 % | TEMPERATURE: 98 F | HEART RATE: 65 BPM

## 2024-01-26 LAB
BACTERIA UR QL AUTO: ABNORMAL /HPF
BILIRUB UR QL STRIP: NEGATIVE
CLARITY UR: ABNORMAL
COLOR UR: ABNORMAL
FLUAV RNA RESP QL NAA+PROBE: NOT DETECTED
FLUBV RNA RESP QL NAA+PROBE: NOT DETECTED
GLUCOSE UR STRIP-MCNC: NEGATIVE MG/DL
HGB UR QL STRIP.AUTO: NEGATIVE
HYALINE CASTS UR QL AUTO: ABNORMAL /LPF
KETONES UR QL STRIP: ABNORMAL
LEUKOCYTE ESTERASE UR QL STRIP.AUTO: ABNORMAL
NITRITE UR QL STRIP: NEGATIVE
PH UR STRIP.AUTO: 5.5 [PH] (ref 5–8)
PROT UR QL STRIP: ABNORMAL
RBC # UR STRIP: ABNORMAL /HPF
REF LAB TEST METHOD: ABNORMAL
SARS-COV-2 RNA RESP QL NAA+PROBE: NOT DETECTED
SP GR UR STRIP: >=1.03 (ref 1–1.03)
SQUAMOUS #/AREA URNS HPF: ABNORMAL /HPF
UROBILINOGEN UR QL STRIP: ABNORMAL
WBC # UR STRIP: ABNORMAL /HPF

## 2024-01-26 PROCEDURE — 81001 URINALYSIS AUTO W/SCOPE: CPT

## 2024-01-26 PROCEDURE — 87636 SARSCOV2 & INF A&B AMP PRB: CPT

## 2024-01-26 RX ORDER — PROMETHAZINE HYDROCHLORIDE 25 MG/1
12.5 TABLET ORAL EVERY 6 HOURS PRN
Qty: 12 TABLET | Refills: 0 | Status: SHIPPED | OUTPATIENT
Start: 2024-01-26

## 2024-01-26 RX ORDER — PROMETHAZINE HYDROCHLORIDE 25 MG/1
12.5 SUPPOSITORY RECTAL EVERY 6 HOURS PRN
Qty: 12 SUPPOSITORY | Refills: 0 | Status: SHIPPED | OUTPATIENT
Start: 2024-01-26

## 2024-01-26 RX ORDER — CEPHALEXIN 500 MG/1
500 CAPSULE ORAL 3 TIMES DAILY
Qty: 12 CAPSULE | Refills: 0 | Status: SHIPPED | OUTPATIENT
Start: 2024-01-26 | End: 2024-01-30

## 2024-01-26 NOTE — ED PROVIDER NOTES
Subjective  History of Present Illness:    This is a 18-year-old female presenting today, history of anxiety asthma coronary artery disease postoperative nausea vomiting PMS, urinary tract infection for evaluation of vomiting during pregnancy.  Patient is currently 19 weeks and 4 days pregnant.  She is a G1, P0 at this point.  She has been taking B6 for nausea and vomiting but reports that today she developed an inability to keep any liquids down.  She does work in a childcare setting.  Has had some congestion and runny nose.  No known fevers.  No reported chest pain or shortness of air.  She denies any abdominal pain and just feels extremely nauseous with vomiting.  She denies any vaginal bleeding.  Denies any pelvic pain.  No urinary symptoms.      Nurses Notes reviewed and agree, including vitals, allergies, social history and prior medical history.     REVIEW OF SYSTEMS: All systems reviewed and not pertinent unless noted.  Review of Systems   Constitutional:  Negative for fever.   HENT:  Positive for congestion and rhinorrhea.    Respiratory:  Negative for shortness of breath.    Cardiovascular:  Negative for chest pain.   Gastrointestinal:  Positive for nausea and vomiting. Negative for abdominal pain and diarrhea.   Genitourinary:  Negative for dysuria, frequency, hematuria, pelvic pain, urgency and vaginal bleeding.   All other systems reviewed and are negative.      Past Medical History:   Diagnosis Date    Abnormal ECG     Anxiety     Asthma     Coronary artery disease     Depression 08/2019    PMS (premenstrual syndrome)     PONV (postoperative nausea and vomiting)     Urinary tract infection     Kathya-Parkinson-White syndrome        Allergies:    Bee venom      Past Surgical History:   Procedure Laterality Date    WISDOM TOOTH EXTRACTION  03/30/21    Date is approximate         Social History     Socioeconomic History    Marital status: Single   Tobacco Use    Smoking status: Former     Packs/day: 0.25  "    Years: 0.50     Additional pack years: 0.00     Total pack years: 0.13     Types: Cigarettes   Substance and Sexual Activity    Alcohol use: Not Currently    Drug use: Never    Sexual activity: Yes     Partners: Male     Birth control/protection: None         Family History   Problem Relation Age of Onset    Prostate cancer Father     Hypertension Father     Diabetes Maternal Grandfather     Breast cancer Maternal Grandmother     Ovarian cancer Maternal Grandmother     Uterine cancer Maternal Aunt     Stroke Sister        Objective  Physical Exam:  /79 (BP Location: Left arm, Patient Position: Sitting)   Pulse 66   Temp 98 °F (36.7 °C) (Oral)   Resp 18   Ht 160 cm (63\")   Wt 81.1 kg (178 lb 12.8 oz)   LMP 09/13/2023 (Exact Date)   SpO2 99%   BMI 31.67 kg/m²      Physical Exam  Vitals and nursing note reviewed.   Constitutional:       General: She is not in acute distress.     Appearance: Normal appearance. She is not ill-appearing, toxic-appearing or diaphoretic.   HENT:      Head: Normocephalic and atraumatic.      Nose: Nose normal.      Mouth/Throat:      Mouth: Mucous membranes are moist.      Pharynx: Oropharynx is clear. No oropharyngeal exudate or posterior oropharyngeal erythema.   Eyes:      Extraocular Movements: Extraocular movements intact.   Cardiovascular:      Rate and Rhythm: Normal rate and regular rhythm.      Pulses: Normal pulses.      Heart sounds: Normal heart sounds.   Pulmonary:      Effort: Pulmonary effort is normal. No respiratory distress.      Breath sounds: Normal breath sounds. No stridor. No wheezing, rhonchi or rales.   Chest:      Chest wall: No tenderness.   Abdominal:      General: There is no distension.      Palpations: Abdomen is soft.      Tenderness: There is no abdominal tenderness. There is no guarding.   Musculoskeletal:         General: Normal range of motion.      Cervical back: Normal range of motion.   Skin:     General: Skin is warm and dry.      " Capillary Refill: Capillary refill takes less than 2 seconds.   Neurological:      General: No focal deficit present.      Mental Status: She is alert and oriented to person, place, and time.   Psychiatric:         Mood and Affect: Mood normal.         Behavior: Behavior normal.         Thought Content: Thought content normal.         Judgment: Judgment normal.               Procedures    ED Course:         Lab Results (last 24 hours)       Procedure Component Value Units Date/Time    CBC & Differential [[id number]]  (Abnormal) Collected: 01/25/24 2155    Specimen: Blood Updated: 01/25/24 2345    Narrative:      The following orders were created for panel order CBC & Differential.  Procedure                               Abnormality         Status                     ---------                               -----------         ------                     CBC Auto Differential[[id number]]        Abnormal            Final result                 Please view results for these tests on the individual orders.    Comprehensive Metabolic Panel [[id number]]  (Abnormal) Collected: 01/25/24 2155    Specimen: Blood Updated: 01/25/24 2330     Glucose 76 mg/dL      BUN 8 mg/dL      Creatinine 0.41 mg/dL      Sodium 135 mmol/L      Potassium 4.0 mmol/L      Comment: Slight hemolysis detected by analyzer. Result may be falsely elevated.        Chloride 101 mmol/L      CO2 17.8 mmol/L      Calcium 8.7 mg/dL      Total Protein 7.1 g/dL      Albumin 3.5 g/dL      ALT (SGPT) 12 U/L      AST (SGOT) 16 U/L      Alkaline Phosphatase 62 U/L      Total Bilirubin 0.6 mg/dL      Globulin 3.6 gm/dL      A/G Ratio 1.0 g/dL      BUN/Creatinine Ratio 19.5     Anion Gap 16.2 mmol/L      eGFR 144.7 mL/min/1.73     Narrative:      GFR Normal >60  Chronic Kidney Disease <60  Kidney Failure <15      CBC Auto Differential [[id number]]  (Abnormal) Collected: 01/25/24 2155    Specimen: Blood Updated: 01/25/24 2345     WBC 10.93 10*3/mm3      RBC 3.98 10*6/mm3       Hemoglobin 12.1 g/dL      Hematocrit 35.5 %      MCV 89.2 fL      MCH 30.4 pg      MCHC 34.1 g/dL      RDW 12.7 %      RDW-SD 41.3 fl      MPV 10.5 fL      Platelets 261 10*3/mm3      Neutrophil % 75.1 %      Lymphocyte % 18.4 %      Monocyte % 4.8 %      Eosinophil % 0.8 %      Basophil % 0.4 %      Immature Grans % 0.5 %      Neutrophils, Absolute 8.21 10*3/mm3      Lymphocytes, Absolute 2.01 10*3/mm3      Monocytes, Absolute 0.52 10*3/mm3      Eosinophils, Absolute 0.09 10*3/mm3      Basophils, Absolute 0.04 10*3/mm3      Immature Grans, Absolute 0.06 10*3/mm3      nRBC 0.0 /100 WBC     Urinalysis With Culture If Indicated - Urine, Clean Catch [[id number]]  (Abnormal) Collected: 01/26/24 0000    Specimen: Urine, Clean Catch Updated: 01/26/24 0028     Color, UA Dark Yellow     Appearance, UA Cloudy     pH, UA 5.5     Specific Gravity, UA >=1.030     Glucose, UA Negative     Ketones, UA 80 mg/dL (3+)     Bilirubin, UA Negative     Blood, UA Negative     Protein, UA Trace     Leuk Esterase, UA Trace     Nitrite, UA Negative     Urobilinogen, UA 1.0 E.U./dL    Narrative:      In absence of clinical symptoms, the presence of pyuria, bacteria, and/or nitrites on the urinalysis result does not correlate with infection.    Urinalysis, Microscopic Only - Urine, Clean Catch [[id number]]  (Abnormal) Collected: 01/26/24 0000    Specimen: Urine, Clean Catch Updated: 01/26/24 0031     RBC, UA None Seen /HPF      WBC, UA 0-2 /HPF      Bacteria, UA 2+ /HPF      Squamous Epithelial Cells, UA 13-20 /HPF      Hyaline Casts, UA 0-2 /LPF      Methodology Manual Light Microscopy    COVID-19 and FLU A/B PCR, 1 HR TAT - Swab, Nasopharynx [[id number]]  (Normal) Collected: 01/26/24 0002    Specimen: Swab from Nasopharynx Updated: 01/26/24 0035     COVID19 Not Detected     Influenza A PCR Not Detected     Influenza B PCR Not Detected    Narrative:      Fact sheet for providers: https://www.fda.gov/media/354932/download    Fact sheet for  patients: https://www.[employer].gov/media/156964/download    Test performed by PCR.             No radiology results from the last 24 hrs       MDM      Initial impression of presenting illness: This is a 18-year-old G1, P0 presenting today for evaluation of nausea and vomiting during pregnancy.    DDX: includes but is not limited to: Dehydration, hyperemesis gravidarum, electrolyte abnormality, others    Patient arrives hemodynamically stable afebrile nontachycardic nonhypoxic on room air nontoxic-appearing with vitals interpreted by myself.     Pertinent features from physical exam: Abdomen soft nontender nonreactive.  Oropharynx clear, moist mucous membranes.  Cardiac oscitation regular rate and rhythm lungs were clear, benign physical exam findings..    Initial diagnostic plan: Fetal heart tones, urinalysis, CBC, CMP    Results from initial plan were reviewed and interpreted by me revealing fetal heart tones 137.  Urinalysis with 80 mg/dL of ketones trace leukocytes bacteria 2+ and 13-20 squamous cells, although this is likely a contaminated sample will treat asymptomatic bacteriuria during pregnancy with Keflex.  CBC with mild leukocytosis at 10.93 likely secondary to vomiting.  CMP unremarkable except for mild elevation in anion gap at 16.2 again likely secondary to vomiting.  COVID flu negative.  There was some mild protein in the urine but she was not hypertensive and do not have suspicion for preeclampsia at this time.    Diagnostic information from other sources: Old record reviewed    Interventions / Re-evaluation: 1 L fluids Reglan and Benadryl.  Patient feels significantly better.  Ready for discharge at this time.    Results/clinical rationale were discussed with patient at bedside.    Consultations/Discussion of results with other physicians: N/A    Disposition plan: Discharge.  Will send Keflex for asymptomatic bacteriuria during pregnancy.  Recommend close follow-up with primary care physician and her  OB/GYN.  Will send Phenergan suppositories as needed for nausea vomiting and also send Phenergan oral as needed for nausea vomiting during pregnancy.  Return precautions were given.  She was agreeable to follow-up with her OB and return for worsening symptoms.  -----    Final diagnoses:   Nausea and vomiting during pregnancy   Asymptomatic bacteriuria during pregnancy          Osbaldo Koroma PA-C  01/26/24 0049

## 2024-01-26 NOTE — DISCHARGE INSTRUCTIONS
Follow-up with your OB/GYN.  Return for any worsening symptoms.  Have sent oral Phenergan as needed for nausea vomiting, if this does not work you can move and try the suppositories into the rectum as needed for nausea and vomiting.  Make sure to drink plenty of fluids.  Have additionally sent Keflex as needed to treat the bacteria in your urine.

## 2024-02-12 ENCOUNTER — ROUTINE PRENATAL (OUTPATIENT)
Dept: OBSTETRICS AND GYNECOLOGY | Facility: CLINIC | Age: 19
End: 2024-02-12
Payer: COMMERCIAL

## 2024-02-12 ENCOUNTER — PATIENT OUTREACH (OUTPATIENT)
Dept: LABOR AND DELIVERY | Facility: HOSPITAL | Age: 19
End: 2024-02-12
Payer: COMMERCIAL

## 2024-02-12 VITALS — SYSTOLIC BLOOD PRESSURE: 102 MMHG | DIASTOLIC BLOOD PRESSURE: 64 MMHG | BODY MASS INDEX: 31.92 KG/M2 | WEIGHT: 180.2 LBS

## 2024-02-12 DIAGNOSIS — Z36.2 ENCOUNTER FOR FOLLOW-UP ULTRASOUND OF FETAL ANATOMY: Primary | ICD-10-CM

## 2024-02-12 PROCEDURE — 99213 OFFICE O/P EST LOW 20 MIN: CPT | Performed by: OBSTETRICS & GYNECOLOGY

## 2024-02-12 RX ORDER — FLUOXETINE HYDROCHLORIDE 20 MG/1
20 CAPSULE ORAL EVERY MORNING
COMMUNITY
Start: 2024-02-05

## 2024-02-12 RX ORDER — FLUOXETINE 10 MG/1
10 CAPSULE ORAL DAILY
COMMUNITY

## 2024-02-27 RX ORDER — OSELTAMIVIR PHOSPHATE 75 MG/1
75 CAPSULE ORAL 2 TIMES DAILY
Qty: 10 CAPSULE | Refills: 0 | Status: SHIPPED | OUTPATIENT
Start: 2024-02-27 | End: 2024-03-03

## 2024-03-11 ENCOUNTER — ROUTINE PRENATAL (OUTPATIENT)
Dept: OBSTETRICS AND GYNECOLOGY | Facility: CLINIC | Age: 19
End: 2024-03-11
Payer: COMMERCIAL

## 2024-03-11 VITALS — WEIGHT: 183 LBS | BODY MASS INDEX: 32.42 KG/M2 | DIASTOLIC BLOOD PRESSURE: 60 MMHG | SYSTOLIC BLOOD PRESSURE: 112 MMHG

## 2024-03-11 DIAGNOSIS — M25.552 BILATERAL HIP PAIN: ICD-10-CM

## 2024-03-11 DIAGNOSIS — K21.9 GASTROESOPHAGEAL REFLUX DISEASE WITHOUT ESOPHAGITIS: ICD-10-CM

## 2024-03-11 DIAGNOSIS — M25.551 BILATERAL HIP PAIN: ICD-10-CM

## 2024-03-11 DIAGNOSIS — F41.8 ANXIETY WITH DEPRESSION: ICD-10-CM

## 2024-03-11 DIAGNOSIS — O09.92 ENCOUNTER FOR SUPERVISION OF HIGH RISK PREGNANCY IN SECOND TRIMESTER, ANTEPARTUM: Primary | ICD-10-CM

## 2024-03-11 DIAGNOSIS — M54.50 LOW BACK PAIN DURING PREGNANCY, SECOND TRIMESTER: ICD-10-CM

## 2024-03-11 DIAGNOSIS — O26.892 LOW BACK PAIN DURING PREGNANCY, SECOND TRIMESTER: ICD-10-CM

## 2024-03-11 LAB
BASOPHILS # BLD AUTO: 0.03 10*3/MM3 (ref 0–0.2)
BASOPHILS NFR BLD AUTO: 0.3 % (ref 0–1.5)
EOSINOPHIL # BLD AUTO: 0.09 10*3/MM3 (ref 0–0.4)
EOSINOPHIL NFR BLD AUTO: 0.9 % (ref 0.3–6.2)
ERYTHROCYTE [DISTWIDTH] IN BLOOD BY AUTOMATED COUNT: 11.9 % (ref 12.3–15.4)
GLUCOSE 1H P 50 G GLC PO SERPL-MCNC: 137 MG/DL (ref 65–139)
HCT VFR BLD AUTO: 33 % (ref 34–46.6)
HGB BLD-MCNC: 10.9 G/DL (ref 12–15.9)
IMM GRANULOCYTES # BLD AUTO: 0.07 10*3/MM3 (ref 0–0.05)
IMM GRANULOCYTES NFR BLD AUTO: 0.7 % (ref 0–0.5)
LYMPHOCYTES # BLD AUTO: 1.83 10*3/MM3 (ref 0.7–3.1)
LYMPHOCYTES NFR BLD AUTO: 18.3 % (ref 19.6–45.3)
MCH RBC QN AUTO: 29.2 PG (ref 26.6–33)
MCHC RBC AUTO-ENTMCNC: 33 G/DL (ref 31.5–35.7)
MCV RBC AUTO: 88.5 FL (ref 79–97)
MONOCYTES # BLD AUTO: 0.47 10*3/MM3 (ref 0.1–0.9)
MONOCYTES NFR BLD AUTO: 4.7 % (ref 5–12)
NEUTROPHILS # BLD AUTO: 7.51 10*3/MM3 (ref 1.7–7)
NEUTROPHILS NFR BLD AUTO: 75.1 % (ref 42.7–76)
NRBC BLD AUTO-RTO: 0 /100 WBC (ref 0–0.2)
PLATELET # BLD AUTO: 317 10*3/MM3 (ref 140–450)
RBC # BLD AUTO: 3.73 10*6/MM3 (ref 3.77–5.28)
WBC # BLD AUTO: 10 10*3/MM3 (ref 3.4–10.8)

## 2024-03-11 PROCEDURE — 99214 OFFICE O/P EST MOD 30 MIN: CPT | Performed by: OBSTETRICS & GYNECOLOGY

## 2024-03-11 NOTE — PROGRESS NOTES
Chief Complaint  Routine Prenatal Visit (Glucola done today, no complaints. )    History of Present Illness:  Jaye is a  currently at 26w1d who presents today with complaints of bilateral low back pain as well as hip pain.  Patient reports worsening of her symptoms throughout the day.  She has difficulty getting up in the evenings.  She denies any cramping or tightening.  She denies any vaginal bleeding or spotting.  She does report good fetal movement.  She has continued on her Prozac.  She has been taking her Pepcid as well.    Exam:  Vitals:  See prenatal flowsheet as noted and reviewed  General: Alert, cooperative, and does not appear in any distress  Abdomen:   See prenatal flowsheet as noted and reviewed    Uterus gravid, non-tender; no palpable masses    No guarding or rebound tenderness  Pelvic:  See prenatal flowsheet as noted and reviewed  Ext:  See prenatal flowsheet as noted and reviewed    Moves extremities well, no cyanosis and no redness  Urine:  See prenatal flowsheet as noted and reviewed    Data Review:  The following data was reviewed by: Daphne Tamayo MD on 2024:  Prenatal Labs:  Lab Results   Component Value Date    HGB 12.1 2024    RUBELLAABIGG 3.76 2023    HEPBSAG Negative 2023    ABO O 2023    RH Positive 2023    ABSCRN Negative 2023    HKG6KQO3 Non Reactive 2023    HEPCVIRUSABY Non Reactive 2023    URINECX 25,000 CFU/mL Normal Urogenital Celena 2022       No visits with results within 1 Month(s) from this visit.   Latest known visit with results is:   Admission on 2024, Discharged on 2024   Component Date Value    Extra Tube 2024 Hold for add-ons.     Extra Tube 2024 hold for add-on     Extra Tube 2024 Hold for add-ons.     Extra Tube 2024 Hold for add-ons.     Glucose 2024 76     BUN 2024 8     Creatinine 2024 0.41 (L)     Sodium 2024 135 (L)     Potassium 2024  4.0     Chloride 01/25/2024 101     CO2 01/25/2024 17.8 (L)     Calcium 01/25/2024 8.7     Total Protein 01/25/2024 7.1     Albumin 01/25/2024 3.5     ALT (SGPT) 01/25/2024 12     AST (SGOT) 01/25/2024 16     Alkaline Phosphatase 01/25/2024 62     Total Bilirubin 01/25/2024 0.6     Globulin 01/25/2024 3.6     A/G Ratio 01/25/2024 1.0     BUN/Creatinine Ratio 01/25/2024 19.5     Anion Gap 01/25/2024 16.2 (H)     eGFR 01/25/2024 144.7     WBC 01/25/2024 10.93 (H)     RBC 01/25/2024 3.98     Hemoglobin 01/25/2024 12.1     Hematocrit 01/25/2024 35.5     MCV 01/25/2024 89.2     MCH 01/25/2024 30.4     MCHC 01/25/2024 34.1     RDW 01/25/2024 12.7     RDW-SD 01/25/2024 41.3     MPV 01/25/2024 10.5     Platelets 01/25/2024 261     Neutrophil % 01/25/2024 75.1     Lymphocyte % 01/25/2024 18.4 (L)     Monocyte % 01/25/2024 4.8 (L)     Eosinophil % 01/25/2024 0.8     Basophil % 01/25/2024 0.4     Immature Grans % 01/25/2024 0.5     Neutrophils, Absolute 01/25/2024 8.21 (H)     Lymphocytes, Absolute 01/25/2024 2.01     Monocytes, Absolute 01/25/2024 0.52     Eosinophils, Absolute 01/25/2024 0.09     Basophils, Absolute 01/25/2024 0.04     Immature Grans, Absolute 01/25/2024 0.06 (H)     nRBC 01/25/2024 0.0     Color, UA 01/26/2024 Dark Yellow (A)     Appearance, UA 01/26/2024 Cloudy (A)     pH, UA 01/26/2024 5.5     Specific Gravity, UA 01/26/2024 >=1.030     Glucose, UA 01/26/2024 Negative     Ketones, UA 01/26/2024 80 mg/dL (3+) (A)     Bilirubin, UA 01/26/2024 Negative     Blood, UA 01/26/2024 Negative     Protein, UA 01/26/2024 Trace (A)     Leuk Esterase, UA 01/26/2024 Trace (A)     Nitrite, UA 01/26/2024 Negative     Urobilinogen, UA 01/26/2024 1.0 E.U./dL     COVID19 01/26/2024 Not Detected     Influenza A PCR 01/26/2024 Not Detected     Influenza B PCR 01/26/2024 Not Detected     RBC, UA 01/26/2024 None Seen     WBC, UA 01/26/2024 0-2     Bacteria, UA 01/26/2024 2+ (A)     Squamous Epithelial Cell* 01/26/2024 13-20 (A)      Hyaline Casts, UA 2024 0-2     Methodology 2024 Manual Light Microscopy      Imaging:  US Ob Follow Up Transabdominal Approach  Follow-up cardiac views normal.  Outflow tracts normal.  Active fetus     Medical Records:  None    Assessment and Plan:  Problem List Items Addressed This Visit    None  Visit Diagnoses       Encounter for supervision of high risk pregnancy in second trimester, antepartum    -  Primary  Topics discussed:     GERD management  kick counts and fetal movement  PIH precautions   labor signs and symptoms  Labs today as noted    Relevant Orders    Gestational Screen 1 Hr ([facility])    CBC & Differential    Gastroesophageal reflux disease without esophagitis      Patient to continue her Pepcid as previously given.    Anxiety with depression      Patient is to continue her 30 mg of Prozac.    Low back pain during pregnancy, second trimester      Patient requesting referral to chiropractor.  Instructions and precautions have been given.  Will check on maternity belt as well.    Relevant Orders    Ambulatory Referral to Chiropractic    Bilateral hip pain      Referral as noted.  Patient instructed in heat as well as Tylenol.    Relevant Orders    Ambulatory Referral to Chiropractic          Follow Up/Instructions:  Follow up as scheduled.  Patient was given instructions and counseling regarding her condition or for health maintenance advice. Please see specific information pulled into the AVS if appropriate.     Note: Speech recognition transcription software may have been used to dictate portions of this document.  An attempt at proofreading has been made though minor errors in transcription may still be present.    This note was electronically signed.  Daphne Tamayo M.D.

## 2024-03-15 ENCOUNTER — TELEPHONE (OUTPATIENT)
Dept: OBSTETRICS AND GYNECOLOGY | Facility: CLINIC | Age: 19
End: 2024-03-15

## 2024-03-15 NOTE — TELEPHONE ENCOUNTER
Caller: Jaye Simon    Relationship: Self    Best call back number: [phone]    What is the best time to reach you: ANY    Who are you requesting to speak with (clinical staff, provider,  specific staff member): UNK    Do you know the name of the person who called: UNK    What was the call regarding: UNSURE    Is it okay if the provider responds through MyChart: YES

## 2024-04-01 ENCOUNTER — ROUTINE PRENATAL (OUTPATIENT)
Dept: OBSTETRICS AND GYNECOLOGY | Facility: CLINIC | Age: 19
End: 2024-04-01
Payer: COMMERCIAL

## 2024-04-01 VITALS — DIASTOLIC BLOOD PRESSURE: 76 MMHG | SYSTOLIC BLOOD PRESSURE: 114 MMHG | BODY MASS INDEX: 33.3 KG/M2 | WEIGHT: 188 LBS

## 2024-04-01 DIAGNOSIS — Z34.03 ENCOUNTER FOR SUPERVISION OF NORMAL FIRST PREGNANCY IN THIRD TRIMESTER: Primary | ICD-10-CM

## 2024-04-01 PROCEDURE — 99213 OFFICE O/P EST LOW 20 MIN: CPT | Performed by: MIDWIFE

## 2024-04-01 NOTE — PROGRESS NOTES
Chief Complaint   Patient presents with    Routine Prenatal Visit     No complaints       HPI: Jaye is a  currently at 29w1d here for prenatal visit who reports the following: Baby is active. She denies any problems.                EXAM:     Vitals:    24 1021   BP: 114/76      Abdomen:   See prenatal flowsheet as noted and reviewed, soft, nontender   Pelvic:  See prenatal flowsheet as noted and reviewed   Urine:  See prenatal flowsheet as noted and reviewed    Lab Results   Component Value Date    ABO O 2023    RH Positive 2023    ABSCRN Negative 2023       MDM:  Impression: Supervision of low risk pregnancy   Tests done today: none   Topics discussed: kick counts and fetal movement   labor signs and symptoms  Reviewed OB labs   Tests next visit: U/S for EFW                RTO:                        3 weeks    This note was electronically signed.  Erika Vasques, APRN  2024

## 2024-04-09 ENCOUNTER — HOSPITAL ENCOUNTER (OUTPATIENT)
Facility: HOSPITAL | Age: 19
Discharge: HOME | End: 2024-04-09
Attending: MIDWIFE | Admitting: MIDWIFE
Payer: COMMERCIAL

## 2024-04-09 VITALS
SYSTOLIC BLOOD PRESSURE: 113 MMHG | WEIGHT: 190.2 LBS | BODY MASS INDEX: 33.7 KG/M2 | OXYGEN SATURATION: 100 % | TEMPERATURE: 97.9 F | HEIGHT: 63 IN | HEART RATE: 51 BPM | RESPIRATION RATE: 20 BRPM | DIASTOLIC BLOOD PRESSURE: 72 MMHG

## 2024-04-09 LAB
BILIRUB BLD-MCNC: NEGATIVE MG/DL
CLARITY, POC: CLEAR
COLOR UR: YELLOW
GLUCOSE UR STRIP-MCNC: NEGATIVE MG/DL
KETONES UR QL: NEGATIVE
LEUKOCYTE EST, POC: NEGATIVE
NITRITE UR-MCNC: NEGATIVE MG/ML
PH UR: 7 [PH] (ref 5–8)
PROT UR STRIP-MCNC: ABNORMAL MG/DL
RBC # UR STRIP: NEGATIVE /UL
SP GR UR: 1.01 (ref 1–1.03)
UROBILINOGEN UR QL: NORMAL

## 2024-04-09 PROCEDURE — 63710000001 ONDANSETRON ODT 4 MG TABLET DISPERSIBLE: Performed by: MIDWIFE

## 2024-04-09 PROCEDURE — G0463 HOSPITAL OUTPT CLINIC VISIT: HCPCS

## 2024-04-09 PROCEDURE — 81002 URINALYSIS NONAUTO W/O SCOPE: CPT | Performed by: MIDWIFE

## 2024-04-09 RX ORDER — ONDANSETRON 4 MG/1
4 TABLET, ORALLY DISINTEGRATING ORAL EVERY 6 HOURS PRN
Status: DISCONTINUED | OUTPATIENT
Start: 2024-04-09 | End: 2024-04-09 | Stop reason: HOSPADM

## 2024-04-09 RX ORDER — SODIUM CHLORIDE 9 MG/ML
40 INJECTION, SOLUTION INTRAVENOUS AS NEEDED
Status: DISCONTINUED | OUTPATIENT
Start: 2024-04-09 | End: 2024-04-09 | Stop reason: HOSPADM

## 2024-04-09 RX ORDER — LIDOCAINE HYDROCHLORIDE 10 MG/ML
0.5 INJECTION, SOLUTION INFILTRATION; PERINEURAL ONCE AS NEEDED
Status: DISCONTINUED | OUTPATIENT
Start: 2024-04-09 | End: 2024-04-09 | Stop reason: HOSPADM

## 2024-04-09 RX ORDER — ACETAMINOPHEN 500 MG
1000 TABLET ORAL EVERY 6 HOURS PRN
Status: DISCONTINUED | OUTPATIENT
Start: 2024-04-09 | End: 2024-04-09 | Stop reason: HOSPADM

## 2024-04-09 RX ORDER — SODIUM CHLORIDE 0.9 % (FLUSH) 0.9 %
10 SYRINGE (ML) INJECTION EVERY 12 HOURS SCHEDULED
Status: DISCONTINUED | OUTPATIENT
Start: 2024-04-09 | End: 2024-04-09 | Stop reason: HOSPADM

## 2024-04-09 RX ORDER — LANOLIN ALCOHOL/MO/W.PET/CERES
50 CREAM (GRAM) TOPICAL DAILY
COMMUNITY

## 2024-04-09 RX ORDER — SODIUM CHLORIDE 0.9 % (FLUSH) 0.9 %
10 SYRINGE (ML) INJECTION AS NEEDED
Status: DISCONTINUED | OUTPATIENT
Start: 2024-04-09 | End: 2024-04-09 | Stop reason: HOSPADM

## 2024-04-09 RX ADMIN — ONDANSETRON 4 MG: 4 TABLET, ORALLY DISINTEGRATING ORAL at 10:13

## 2024-04-09 RX ADMIN — ACETAMINOPHEN 1000 MG: 500 TABLET, FILM COATED ORAL at 09:37

## 2024-04-09 NOTE — NON STRESS TEST
Triage Note - Nursing Documentation  Labor and Delivery Admission Log    Jaye Simon  : 2005  MRN: [id number]  CSN: [id number]    Date in / Time in:  2024  Time in: 835    Date out / Time out:    Time out: 1017    Nurse: Roe Stack RN    Patient Info: She is a 18 y.o. year old  at 30w2d with an DECLAN of 2024, by Ultrasound who was seen on the [facility].    Chief Complaint:   Chief Complaint   Patient presents with    Abdominal Pain     Describes lower abdominal pain as cramping and reports having sharp back pain       Provider Instructions / Disposition: Went to ER to get Patient.  She reports having lower abdominal pain, which she described as cramping, and sharp back pain that she reports started about 0730.  No contractions noted.  Denies any leaking fluid.  Reports feeling good fetal movement. Denies any HA, visual disturbances, or upper epigastric pain.  She was given Tylenol 1000mg for pain and was given 4mg ODT Zofran.  She was discharged in stable condition.  Follow up and take home instructions given with v/u per pt and sig other    There is no problem list on file for this patient.      NST Documentation (Only applicable > 32 weeks):

## 2024-04-22 ENCOUNTER — ROUTINE PRENATAL (OUTPATIENT)
Dept: OBSTETRICS AND GYNECOLOGY | Facility: CLINIC | Age: 19
End: 2024-04-22
Payer: COMMERCIAL

## 2024-04-22 ENCOUNTER — PATIENT OUTREACH (OUTPATIENT)
Dept: LABOR AND DELIVERY | Facility: HOSPITAL | Age: 19
End: 2024-04-22
Payer: COMMERCIAL

## 2024-04-22 VITALS — BODY MASS INDEX: 33.48 KG/M2 | SYSTOLIC BLOOD PRESSURE: 110 MMHG | WEIGHT: 189 LBS | DIASTOLIC BLOOD PRESSURE: 66 MMHG

## 2024-04-22 DIAGNOSIS — Z36.89 ENCOUNTER FOR ULTRASOUND TO ASSESS FETAL GROWTH: ICD-10-CM

## 2024-04-22 DIAGNOSIS — O99.340 ANXIETY DURING PREGNANCY: ICD-10-CM

## 2024-04-22 DIAGNOSIS — Z86.79 HISTORY OF WOLFF-PARKINSON-WHITE (WPW) SYNDROME: ICD-10-CM

## 2024-04-22 DIAGNOSIS — Z34.03 ENCOUNTER FOR SUPERVISION OF NORMAL FIRST PREGNANCY IN THIRD TRIMESTER: Primary | ICD-10-CM

## 2024-04-22 DIAGNOSIS — F41.9 ANXIETY DURING PREGNANCY: ICD-10-CM

## 2024-04-22 PROCEDURE — 99213 OFFICE O/P EST LOW 20 MIN: CPT | Performed by: STUDENT IN AN ORGANIZED HEALTH CARE EDUCATION/TRAINING PROGRAM

## 2024-04-22 NOTE — OUTREACH NOTE
[facility]  Check-In    Current Estimated Gestational Age: 32w1d      Questions/Answers      Flowsheet Row Responses   Best Method for Contacting Cell   Demographics Reviewed Yes   Currently Employed Yes   Able to keep appointments as scheduled Yes   Gender(s) and Name(s) Girl-Rema Esperanza   Baby Active/Feeling Fetal Movemen Yes   Questions regarding prenatal visits or tests to be ordered? No   Education related to new diagnoses/home equipment No   Supplies ready for baby Breast Pump   Resource/Environmental Concerns None   Do you have any questions related to your care experience, your pregnancy, plans for delivery, any concerns, etc? No   Other Education Other   Other Education Comment Breastfeeding and Birthing classes          Met with pt in office today. SDOH completed. Pt states she would love information about birthing and breastfeeding classes. Sending information through [facility].     Ashley OBREGON  Maternity Nurse Navigator    4/22/2024, 11:12 EDT    SDOH updated and reviewed with the patient during this program:  --     Alcohol Use: Not At Risk (4/22/2024)    AUDIT-C     Frequency of Alcohol Consumption: Never     Average Number of Drinks: Patient does not drink     Frequency of Binge Drinking: Never      --     Depression: Not at risk (4/22/2024)    PHQ-2     PHQ-2 Score: 2      --     Disabilities: At Risk (4/22/2024)    Disabilities     Concentrating, Remembering, or Making Decisions Difficulty: yes     Doing Errands Independently Difficulty: yes      --     Employment: Not At Risk (4/22/2024)    Employment     Do you want help finding or keeping work or a job?: I do not need or want help      Financial Resource Strain: Low Risk  (4/22/2024)    Overall Financial Resource Strain (CARDIA)     Difficulty of Paying Living Expenses: Not very hard      --     Food Insecurity: Food Insecurity Present (4/22/2024)    Hunger Vital Sign     Worried About Running Out of Food in the Last Year: Sometimes true      Ran Out of Food in the Last Year: Sometimes true      --     Health Literacy: Not At Risk (4/22/2024)    Education     Help with school or training?: No     Preferred Language: English      --     Housing Stability: Not At Risk (4/22/2024)    Housing Stability     Current Living Arrangements: apartment     Potentially Unsafe Housing Conditions: none      --     Interpersonal Safety: Not At Risk (4/22/2024)    Abuse Screen     Unsafe at Home or Work/School: no     Feels Threatened by Someone?: no     Does Anyone Keep You from Contacting Others or Doint Things Outside the Home?: no     Physical Sign of Abuse Present: no      --     Physical Activity: Sufficiently Active (4/22/2024)    Exercise Vital Sign     Days of Exercise per Week: 5 days     Minutes of Exercise per Session: 30 min      --     Social Connections: Not At Risk (4/22/2024)    Family and Community Support     Help with Day-to-Day Activities: I get all the help I need     Lonely or Isolated: Sometimes      --     Transportation Needs: No Transportation Needs (4/22/2024)    PRAPARE - Transportation     Lack of Transportation (Medical): No     Lack of Transportation (Non-Medical): No      --     Utilities: Not At Risk (4/22/2024)    [facility] Utilities     Threatened with loss of utilities: No

## 2024-04-22 NOTE — PROGRESS NOTES
Prenatal Care Visit    Subjective   Chief Complaint   Patient presents with    Routine Prenatal Visit     No complaints. Scan today     History:   Jaye is a  currently at 32w1d who presents for a prenatal care visit today.    Denies CTX, VB, LOF. Reports (+) FM.     Objective   /66   Wt 85.7 kg (189 lb)   LMP 2023 (Exact Date)   BMI 33.48 kg/m²   Physical Exam:  Normal, gestational age-appropriate exam today      Assessment & Plan     IUP @ 32w1d  Routine care: I have reviewed the prenatal labs and ultrasound(s) today. I have reviewed the most recent prenatal progress note(s). Growth US today - EFW 2163g (75%), AC 90%, NI 13.6 cm, vertex.  WPW syndrome: follows with [facility] peds cardiology. S/p ablation of accessory bypass tract at [facility] 3/9/23. Normal EKG this pregnancy. Cardiology recs - No specific recommendations for care during pregnancy. No additional surgical/ anesthetic risks related to her cardiac history. No additional fetal workup needed.  Trichomonas in pregnancy: s/p treatment with negative VARGHESE  Anxiety/ depression: prozac 30 mg QD, stable     Diagnosis Plan   1. Encounter for supervision of normal first pregnancy in third trimester        2. Encounter for ultrasound to assess fetal growth  US Ob Follow Up Transabdominal Approach      3. History of Kathya-Parkinson-White (WPW) syndrome        4. Anxiety during pregnancy           Medication Management: continue PNV, prozac    Topics discussed: Prenatal care milestones  Kick counts and fetal movement   labor signs and symptoms   Tests next visit: none   Next visit: 2 week(s)     Marcela Sharif MD  Obstetrics and Gynecology  [facility]

## 2024-04-26 ENCOUNTER — HOSPITAL ENCOUNTER (OUTPATIENT)
Facility: HOSPITAL | Age: 19
Discharge: HOME | End: 2024-04-26
Attending: MIDWIFE | Admitting: MIDWIFE
Payer: COMMERCIAL

## 2024-04-26 VITALS
HEART RATE: 62 BPM | OXYGEN SATURATION: 98 % | SYSTOLIC BLOOD PRESSURE: 103 MMHG | BODY MASS INDEX: 33.84 KG/M2 | TEMPERATURE: 97.8 F | HEIGHT: 63 IN | RESPIRATION RATE: 18 BRPM | DIASTOLIC BLOOD PRESSURE: 85 MMHG | WEIGHT: 191 LBS

## 2024-04-26 LAB
BASOPHILS # BLD AUTO: 0.05 10*3/MM3 (ref 0–0.2)
BASOPHILS NFR BLD AUTO: 0.4 % (ref 0–1.5)
BILIRUB BLD-MCNC: NEGATIVE MG/DL
CLARITY, POC: CLEAR
COLOR UR: YELLOW
DEPRECATED RDW RBC AUTO: 43.3 FL (ref 37–54)
EOSINOPHIL # BLD AUTO: 0.07 10*3/MM3 (ref 0–0.4)
EOSINOPHIL NFR BLD AUTO: 0.6 % (ref 0.3–6.2)
ERYTHROCYTE [DISTWIDTH] IN BLOOD BY AUTOMATED COUNT: 13.3 % (ref 12.3–15.4)
FIBRINOGEN PPP-MCNC: 608 MG/DL (ref 209–518)
GLUCOSE UR STRIP-MCNC: NEGATIVE MG/DL
HCT VFR BLD AUTO: 33.3 % (ref 34–46.6)
HGB BLD-MCNC: 11.3 G/DL (ref 12–15.9)
HGB F BLD QL KLEIH BETKE: NORMAL
IMM GRANULOCYTES # BLD AUTO: 0.08 10*3/MM3 (ref 0–0.05)
IMM GRANULOCYTES NFR BLD AUTO: 0.7 % (ref 0–0.5)
KETONES UR QL: ABNORMAL
LEUKOCYTE EST, POC: NEGATIVE
LYMPHOCYTES # BLD AUTO: 2.51 10*3/MM3 (ref 0.7–3.1)
LYMPHOCYTES NFR BLD AUTO: 20.8 % (ref 19.6–45.3)
MCH RBC QN AUTO: 30 PG (ref 26.6–33)
MCHC RBC AUTO-ENTMCNC: 33.9 G/DL (ref 31.5–35.7)
MCV RBC AUTO: 88.3 FL (ref 79–97)
MONOCYTES # BLD AUTO: 0.56 10*3/MM3 (ref 0.1–0.9)
MONOCYTES NFR BLD AUTO: 4.7 % (ref 5–12)
NEUTROPHILS NFR BLD AUTO: 72.8 % (ref 42.7–76)
NEUTROPHILS NFR BLD AUTO: 8.77 10*3/MM3 (ref 1.7–7)
NITRITE UR-MCNC: NEGATIVE MG/ML
NRBC BLD AUTO-RTO: 0 /100 WBC (ref 0–0.2)
PH UR: 6 [PH] (ref 5–8)
PLATELET # BLD AUTO: 234 10*3/MM3 (ref 140–450)
PMV BLD AUTO: 10.2 FL (ref 6–12)
PROT UR STRIP-MCNC: NEGATIVE MG/DL
RBC # BLD AUTO: 3.77 10*6/MM3 (ref 3.77–5.28)
RBC # UR STRIP: NEGATIVE /UL
SP GR UR: 1.02 (ref 1–1.03)
UROBILINOGEN UR QL: NORMAL
WBC NRBC COR # BLD AUTO: 12.04 10*3/MM3 (ref 3.4–10.8)

## 2024-04-26 PROCEDURE — G0463 HOSPITAL OUTPT CLINIC VISIT: HCPCS

## 2024-04-26 PROCEDURE — 85460 HEMOGLOBIN FETAL: CPT | Performed by: MIDWIFE

## 2024-04-26 PROCEDURE — 85025 COMPLETE CBC W/AUTO DIFF WBC: CPT | Performed by: MIDWIFE

## 2024-04-26 PROCEDURE — 59025 FETAL NON-STRESS TEST: CPT | Performed by: MIDWIFE

## 2024-04-26 PROCEDURE — 59025 FETAL NON-STRESS TEST: CPT

## 2024-04-26 PROCEDURE — 99214 OFFICE O/P EST MOD 30 MIN: CPT | Performed by: MIDWIFE

## 2024-04-26 PROCEDURE — 85384 FIBRINOGEN ACTIVITY: CPT | Performed by: MIDWIFE

## 2024-04-26 PROCEDURE — 81002 URINALYSIS NONAUTO W/O SCOPE: CPT | Performed by: MIDWIFE

## 2024-04-26 PROCEDURE — 36415 COLL VENOUS BLD VENIPUNCTURE: CPT | Performed by: MIDWIFE

## 2024-04-26 RX ORDER — SODIUM CHLORIDE 9 MG/ML
40 INJECTION, SOLUTION INTRAVENOUS AS NEEDED
Status: DISCONTINUED | OUTPATIENT
Start: 2024-04-26 | End: 2024-04-26 | Stop reason: HOSPADM

## 2024-04-26 RX ORDER — LIDOCAINE HYDROCHLORIDE 10 MG/ML
0.5 INJECTION, SOLUTION INFILTRATION; PERINEURAL ONCE AS NEEDED
Status: DISCONTINUED | OUTPATIENT
Start: 2024-04-26 | End: 2024-04-26 | Stop reason: HOSPADM

## 2024-04-26 RX ORDER — SODIUM CHLORIDE 0.9 % (FLUSH) 0.9 %
10 SYRINGE (ML) INJECTION EVERY 12 HOURS SCHEDULED
Status: DISCONTINUED | OUTPATIENT
Start: 2024-04-26 | End: 2024-04-26 | Stop reason: HOSPADM

## 2024-04-26 RX ORDER — SODIUM CHLORIDE 0.9 % (FLUSH) 0.9 %
10 SYRINGE (ML) INJECTION AS NEEDED
Status: DISCONTINUED | OUTPATIENT
Start: 2024-04-26 | End: 2024-04-26 | Stop reason: HOSPADM

## 2024-04-26 NOTE — H&P
: 2005  MRN: [id number]  CSN: [id number]    History and Physical    Chief Complaint   Patient presents with    Fall     PT DOES NOT THINK SHE FELL ON ABDOMEN.  SHE THINKS SHE ROLLED TO HER SIDE. RIGHT ANKLE PAIN      Jaye Simon is a 18 y.o. year old  with an Estimated Date of Delivery: 24 currently at 32w5d presenting after a fall at 1000.  She twisted her right ankle and skinned her right shin and fell to the ground on her right side. She denies any bleeding, LOF, or cramping/ctxs. Baby has been active.    She has received prenatal care with [facility]. It has been complicated by trichomoniasis, WPW syndrome, and anxiety/depression.      OB History    Para Term  AB Living   1 0 0 0 0 0   SAB IAB Ectopic Molar Multiple Live Births   0 0 0 0 0 0      # Outcome Date GA Lbr Constantino/2nd Weight Sex Type Anes PTL Lv   1 Current              Past Medical History:   Diagnosis Date    Abnormal ECG     Anxiety     Asthma     Coronary artery disease     Depression 2019    PMS (premenstrual syndrome)     PONV (postoperative nausea and vomiting)     Urinary tract infection     Kathya-Parkinson-White syndrome      Past Surgical History:   Procedure Laterality Date    CARDIAC ABLATION  2023    WISDOM TOOTH EXTRACTION  21    Date is approximate       Current Facility-Administered Medications:     lidocaine (XYLOCAINE) 1 % injection 0.5 mL, 0.5 mL, Intradermal, Once PRN, Layo, Erika A, CNM    sodium chloride 0.9 % flush 10 mL, 10 mL, Intravenous, Q12H, Foster, Erika A, CNM    sodium chloride 0.9 % flush 10 mL, 10 mL, Intravenous, PRN, Layo, Erika A, CNM    sodium chloride 0.9 % infusion 40 mL, 40 mL, Intravenous, PRN, Foster, Erika A, CNM    Allergies   Allergen Reactions    Bee Venom Anaphylaxis       Review of Systems     Respiratory ROS: no cough, shortness of breath, or wheezing  Cardiovascular ROS: no chest pain or dyspnea on exertion  Gastrointestinal ROS: no  "abdominal pain, change in bowel habits, or black or bloody stools  Genito-Urinary ROS: no dysuria, trouble voiding, or hematuria        Objective   /85   Pulse 62   Temp 97.8 °F (36.6 °C) (Oral)   Resp 18   Ht 160 cm (63\")   Wt 86.6 kg (191 lb)   LMP 09/13/2023 (Exact Date)   SpO2 98%   BMI 33.83 kg/m²     Physical Exam: General Appearance: alert, appears stated age, and cooperative  Lungs: respirations regular, respirations even, and respirations unlabored  Extremities: moves extremities well, no edema, no cyanosis, and no redness  Skin: no bleeding, bruising or rash, no lesions noted, and small abrasion on right shin  Neurologic: Mental Status orientated to person, place, time and situation, Speech normal content and execusion       FHT's: reassuring, reactive, and category 1      Cervix: was not checked.   Presentation: cephalic   Contractions: none - external monitors used         Prenatal Labs  Lab Results   Component Value Date    HGB 11.3 (L) 04/26/2024    HEPBSAG Negative 11/17/2023    ABSCRN Negative 11/17/2023    [id number] Non Reactive 11/17/2023    HEPCVIRUSABY Non Reactive 11/17/2023    URINECX 25,000 CFU/mL Normal Urogenital Celena 06/27/2022       Current Labs Reviewed   Lab Results (last 24 hours)       Procedure Component Value Units Date/Time    Fibrinogen [[id number]]  (Abnormal) Collected: 04/26/24 1239    Specimen: Blood Updated: 04/26/24 1310     Fibrinogen 608 mg/dL     CBC & Differential [[id number]]  (Abnormal) Collected: 04/26/24 1239    Specimen: Blood Updated: 04/26/24 1256    Narrative:      The following orders were created for panel order CBC & Differential.  Procedure                               Abnormality         Status                     ---------                               -----------         ------                     CBC Auto Differential[[id number]]        Abnormal            Final result                 Please view results for these tests on the individual " orders.    CBC Auto Differential [[id number]]  (Abnormal) Collected: 04/26/24 1239    Specimen: Blood Updated: 04/26/24 1256     WBC 12.04 10*3/mm3      RBC 3.77 10*6/mm3      Hemoglobin 11.3 g/dL      Hematocrit 33.3 %      MCV 88.3 fL      MCH 30.0 pg      MCHC 33.9 g/dL      RDW 13.3 %      RDW-SD 43.3 fl      MPV 10.2 fL      Platelets 234 10*3/mm3      Neutrophil % 72.8 %      Lymphocyte % 20.8 %      Monocyte % 4.7 %      Eosinophil % 0.6 %      Basophil % 0.4 %      Immature Grans % 0.7 %      Neutrophils, Absolute 8.77 10*3/mm3      Lymphocytes, Absolute 2.51 10*3/mm3      Monocytes, Absolute 0.56 10*3/mm3      Eosinophils, Absolute 0.07 10*3/mm3      Basophils, Absolute 0.05 10*3/mm3      Immature Grans, Absolute 0.08 10*3/mm3      nRBC 0.0 /100 WBC     POC Urinalysis Dipstick [[id number]]  (Abnormal) Collected: 04/26/24 1154    Specimen: Urine, Clean Catch Updated: 04/26/24 1155     Color Yellow     Clarity, UA Clear     Glucose, UA Negative mg/dL      Bilirubin Negative     Ketones, UA Trace     Specific Gravity  1.020     Blood, UA Negative     pH, Urine 6.0     Protein, POC Negative mg/dL      Urobilinogen, UA Normal     Leukocytes Negative     Nitrite, UA Negative          Imaging:   US Ob Follow Up Transabdominal Approach (04/22/2024 10:39)        Assessment   IUP at 32w5d  Fall            Plan   EFM   x 4 hours  CBC. Fibrinogen, KB stain  Keep scheduled followup  Precautions discussed      New Medications Ordered This Visit   Medications    sodium chloride 0.9 % flush 10 mL    sodium chloride 0.9 % flush 10 mL    sodium chloride 0.9 % infusion 40 mL    lidocaine (XYLOCAINE) 1 % injection 0.5 mL       Erika Vasques CNM  4/26/2024  14:07 EDT

## 2024-04-26 NOTE — NON STRESS TEST
Triage Note - Nursing Documentation  Labor and Delivery Admission Log    Jaye Simon  : 2005  MRN: [id number]  CSN: [id number]    Date in / Time in:  2024  Time in: 1143    Date out / Time out:    Time out: 1613    Nurse: Roe Stack RN    Patient Info: She is a 18 y.o. year old  at 32w5d with an DECLAN of 2024, by Ultrasound who was seen on the [facility].    Chief Complaint:   Chief Complaint   Patient presents with    Fall     PT DOES NOT THINK SHE FELL ON ABDOMEN.  SHE THINKS SHE ROLLED TO HER SIDE. RIGHT ANKLE PAIN       Provider Instructions / Disposition: Pt presented to [facility], post a fall she had today at approx 1000.  She reported being at work and running to a child and falling in the mulch.  She did not think she had hit her abdomen when she fell.  Stated she twisted her right ankle and fell but thought she had rolled to her side during the fall.  She is feeling good fetal movement.  No swelling or bruising noted on right ankle.  She does have a scrape on her shin.  She was given an ice pack for her ankle. Lab work was done.  She was monitored for 4 hours on [facility].  She was encouraged to be seen in the ER for possible xray of ankle.  At time of discharge, she rated her pain level at 0, from 3-5 on arrival.  She was discharged in stable condition.  Her follow up and take home instructions were given, including fetal movement,  labor and  labor. She verbalized understanding.  She was discharged in stable condition.    There is no problem list on file for this patient.      NST Documentation (Only applicable > 32 weeks):

## 2024-05-06 ENCOUNTER — ROUTINE PRENATAL (OUTPATIENT)
Dept: OBSTETRICS AND GYNECOLOGY | Facility: CLINIC | Age: 19
End: 2024-05-06
Payer: COMMERCIAL

## 2024-05-06 VITALS — DIASTOLIC BLOOD PRESSURE: 68 MMHG | WEIGHT: 195 LBS | SYSTOLIC BLOOD PRESSURE: 104 MMHG | BODY MASS INDEX: 34.54 KG/M2

## 2024-05-06 DIAGNOSIS — Z34.93 PRENATAL CARE IN THIRD TRIMESTER: Primary | ICD-10-CM

## 2024-05-06 PROCEDURE — 99213 OFFICE O/P EST LOW 20 MIN: CPT | Performed by: OBSTETRICS & GYNECOLOGY

## 2024-05-21 ENCOUNTER — ROUTINE PRENATAL (OUTPATIENT)
Dept: OBSTETRICS AND GYNECOLOGY | Facility: CLINIC | Age: 19
End: 2024-05-21
Payer: COMMERCIAL

## 2024-05-21 VITALS — DIASTOLIC BLOOD PRESSURE: 64 MMHG | BODY MASS INDEX: 35.07 KG/M2 | WEIGHT: 198 LBS | SYSTOLIC BLOOD PRESSURE: 102 MMHG

## 2024-05-21 DIAGNOSIS — Z34.93 PRENATAL CARE IN THIRD TRIMESTER: Primary | ICD-10-CM

## 2024-05-21 DIAGNOSIS — Z36.85 ANTENATAL SCREENING FOR STREPTOCOCCUS B: ICD-10-CM

## 2024-05-21 PROCEDURE — 99213 OFFICE O/P EST LOW 20 MIN: CPT | Performed by: OBSTETRICS & GYNECOLOGY

## 2024-05-21 NOTE — PROGRESS NOTES
Prenatal Care Visit    Subjective   Chief Complaint   Patient presents with    Routine Prenatal Visit     GBS, repeat growth scan done today. No complaints       History:   Jaye is a  currently at 36w2d who presents for a prenatal care visit today.    No issues.    Social History    Tobacco Use      Smoking status: Former        Packs/day: 0.25        Years: 0.3 packs/day for 0.6 years (0.1 ttl pk-yrs)        Types: Cigarettes      Smokeless tobacco: Not on file       Objective   /64   Wt 89.8 kg (198 lb)   LMP 2023 (Exact Date)   BMI 35.07 kg/m²   Physical Exam:  Normal, gestational age-appropriate exam today        Plan   Medical Decision Making:    I have reviewed the prenatal labs and ultrasound(s) today. I have reviewed the most recent prenatal progress note(s).    Diagnosis: Supervision of high risk pregnancy  WPW s/p ablation in   Anxiety/Depression  Swelling   Tests/Orders/Rx today: Orders Placed This Encounter   Procedures    Strep Grp B MARK + Reflex - Swab, Vaginal/Rectum     Order Specific Question:   Release to patient     Answer:   Routine Release [[id number]]       Medication Management: none     Topics discussed: Prenatal care milestones  kick counts and fetal movement  PIH precautions   labor signs and symptoms  U/S findings  Swelling   Tests next visit: none   Next visit: 1 week(s)     Hayder Martinez MD  Obstetrics and Gynecology  [facility]

## 2024-05-23 LAB — GP B STREP DNA SPEC QL NAA+PROBE: NEGATIVE

## 2024-05-30 ENCOUNTER — ROUTINE PRENATAL (OUTPATIENT)
Dept: OBSTETRICS AND GYNECOLOGY | Facility: CLINIC | Age: 19
End: 2024-05-30
Payer: COMMERCIAL

## 2024-05-30 VITALS — SYSTOLIC BLOOD PRESSURE: 122 MMHG | BODY MASS INDEX: 35.18 KG/M2 | DIASTOLIC BLOOD PRESSURE: 78 MMHG | WEIGHT: 198.6 LBS

## 2024-05-30 DIAGNOSIS — Z34.03 ENCOUNTER FOR SUPERVISION OF NORMAL FIRST PREGNANCY IN THIRD TRIMESTER: Primary | ICD-10-CM

## 2024-05-30 NOTE — PROGRESS NOTES
Chief Complaint   Patient presents with    Routine Prenatal Visit     Ob follow-up. No complaints, doing well.        HPI: Jaye is a  currently at 37w4d here for prenatal visit who reports the following: Baby is active. She denies regular ctxs. She is considering an induction at 39 weeks                EXAM:     Vitals:    24 1007   BP: 122/78      Abdomen:   See prenatal flowsheet as noted and reviewed, soft, nontender   Pelvic:  See prenatal flowsheet as noted and reviewed   Urine:  See prenatal flowsheet as noted and reviewed    Lab Results   Component Value Date    ABO O 2023    RH Positive 2023    ABSCRN Negative 2023       MDM:  Impression: Supervision of low risk pregnancy   Tests done today: none   Topics discussed: kick counts and fetal movement  labor signs and symptoms  Reviewed OB labs   Tests next visit: none                RTO:                        1 weeks    This note was electronically signed.  Erika Vasques, MILADY  2024

## 2024-05-31 ENCOUNTER — ANESTHESIA (OUTPATIENT)
Dept: LABOR AND DELIVERY | Facility: HOSPITAL | Age: 19
End: 2024-05-31
Payer: COMMERCIAL

## 2024-05-31 ENCOUNTER — ANESTHESIA EVENT (OUTPATIENT)
Dept: LABOR AND DELIVERY | Facility: HOSPITAL | Age: 19
End: 2024-05-31
Payer: COMMERCIAL

## 2024-05-31 ENCOUNTER — HOSPITAL ENCOUNTER (INPATIENT)
Facility: HOSPITAL | Age: 19
LOS: 2 days | Discharge: HOME | End: 2024-06-02
Attending: MIDWIFE | Admitting: OBSTETRICS & GYNECOLOGY
Payer: COMMERCIAL

## 2024-05-31 PROBLEM — Z3A.37 37 WEEKS GESTATION OF PREGNANCY: Status: ACTIVE | Noted: 2024-05-31

## 2024-05-31 PROBLEM — Z34.90 PREGNANCY: Status: ACTIVE | Noted: 2024-05-31

## 2024-05-31 LAB
A1 MICROGLOB PLACENTAL VAG QL: POSITIVE
ABO GROUP BLD: NORMAL
ABO GROUP BLD: NORMAL
BACTERIA UR QL AUTO: ABNORMAL /HPF
BASOPHILS # BLD AUTO: 0.06 10*3/MM3 (ref 0–0.2)
BASOPHILS NFR BLD AUTO: 0.5 % (ref 0–1.5)
BILIRUB UR QL STRIP: NEGATIVE
BLD GP AB SCN SERPL QL: NEGATIVE
CLARITY UR: CLEAR
COLOR UR: YELLOW
DEPRECATED RDW RBC AUTO: 43.5 FL (ref 37–54)
EOSINOPHIL # BLD AUTO: 0.07 10*3/MM3 (ref 0–0.4)
EOSINOPHIL NFR BLD AUTO: 0.6 % (ref 0.3–6.2)
ERYTHROCYTE [DISTWIDTH] IN BLOOD BY AUTOMATED COUNT: 13.4 % (ref 12.3–15.4)
GLUCOSE UR STRIP-MCNC: NEGATIVE MG/DL
HCT VFR BLD AUTO: 35 % (ref 34–46.6)
HGB BLD-MCNC: 11.6 G/DL (ref 12–15.9)
HGB UR QL STRIP.AUTO: NEGATIVE
HYALINE CASTS UR QL AUTO: ABNORMAL /LPF
IMM GRANULOCYTES # BLD AUTO: 0.06 10*3/MM3 (ref 0–0.05)
IMM GRANULOCYTES NFR BLD AUTO: 0.5 % (ref 0–0.5)
KETONES UR QL STRIP: NEGATIVE
LEUKOCYTE ESTERASE UR QL STRIP.AUTO: NEGATIVE
LYMPHOCYTES # BLD AUTO: 2.55 10*3/MM3 (ref 0.7–3.1)
LYMPHOCYTES NFR BLD AUTO: 22.9 % (ref 19.6–45.3)
MCH RBC QN AUTO: 29.1 PG (ref 26.6–33)
MCHC RBC AUTO-ENTMCNC: 33.1 G/DL (ref 31.5–35.7)
MCV RBC AUTO: 87.9 FL (ref 79–97)
MONOCYTES # BLD AUTO: 0.66 10*3/MM3 (ref 0.1–0.9)
MONOCYTES NFR BLD AUTO: 5.9 % (ref 5–12)
MUCOUS THREADS URNS QL MICRO: ABNORMAL /HPF
NEUTROPHILS NFR BLD AUTO: 69.6 % (ref 42.7–76)
NEUTROPHILS NFR BLD AUTO: 7.75 10*3/MM3 (ref 1.7–7)
NITRITE UR QL STRIP: NEGATIVE
NRBC BLD AUTO-RTO: 0 /100 WBC (ref 0–0.2)
PH UR STRIP.AUTO: 6.5 [PH] (ref 5–8)
PLATELET # BLD AUTO: 244 10*3/MM3 (ref 140–450)
PMV BLD AUTO: 10.7 FL (ref 6–12)
PROT UR QL STRIP: ABNORMAL
RBC # BLD AUTO: 3.98 10*6/MM3 (ref 3.77–5.28)
RBC # UR STRIP: ABNORMAL /HPF
REF LAB TEST METHOD: ABNORMAL
RH BLD: POSITIVE
RH BLD: POSITIVE
SP GR UR STRIP: 1.02 (ref 1–1.03)
SQUAMOUS #/AREA URNS HPF: ABNORMAL /HPF
T PALLIDUM IGG SER QL: NORMAL
T&S EXPIRATION DATE: NORMAL
UROBILINOGEN UR QL STRIP: ABNORMAL
WBC # UR STRIP: ABNORMAL /HPF
WBC NRBC COR # BLD AUTO: 11.15 10*3/MM3 (ref 3.4–10.8)

## 2024-05-31 PROCEDURE — 86901 BLOOD TYPING SEROLOGIC RH(D): CPT | Performed by: MIDWIFE

## 2024-05-31 PROCEDURE — 86900 BLOOD TYPING SEROLOGIC ABO: CPT

## 2024-05-31 PROCEDURE — 25010000002 LIDOCAINE 1 % SOLUTION: Performed by: NURSE ANESTHETIST, CERTIFIED REGISTERED

## 2024-05-31 PROCEDURE — 86900 BLOOD TYPING SEROLOGIC ABO: CPT | Performed by: MIDWIFE

## 2024-05-31 PROCEDURE — 86780 TREPONEMA PALLIDUM: CPT | Performed by: MIDWIFE

## 2024-05-31 PROCEDURE — C1755 CATHETER, INTRASPINAL: HCPCS | Performed by: NURSE ANESTHETIST, CERTIFIED REGISTERED

## 2024-05-31 PROCEDURE — S0260 H&P FOR SURGERY: HCPCS | Performed by: MIDWIFE

## 2024-05-31 PROCEDURE — 59410 OBSTETRICAL CARE: CPT | Performed by: STUDENT IN AN ORGANIZED HEALTH CARE EDUCATION/TRAINING PROGRAM

## 2024-05-31 PROCEDURE — 86850 RBC ANTIBODY SCREEN: CPT | Performed by: MIDWIFE

## 2024-05-31 PROCEDURE — 84112 EVAL AMNIOTIC FLUID PROTEIN: CPT | Performed by: MIDWIFE

## 2024-05-31 PROCEDURE — 85025 COMPLETE CBC W/AUTO DIFF WBC: CPT | Performed by: MIDWIFE

## 2024-05-31 PROCEDURE — 25010000002 FENTANYL CITRATE (PF) 50 MCG/ML SOLUTION: Performed by: NURSE ANESTHETIST, CERTIFIED REGISTERED

## 2024-05-31 PROCEDURE — 25810000003 LACTATED RINGERS PER 1000 ML: Performed by: MIDWIFE

## 2024-05-31 PROCEDURE — 0UQMXZZ REPAIR VULVA, EXTERNAL APPROACH: ICD-10-PCS | Performed by: STUDENT IN AN ORGANIZED HEALTH CARE EDUCATION/TRAINING PROGRAM

## 2024-05-31 PROCEDURE — 0UQGXZZ REPAIR VAGINA, EXTERNAL APPROACH: ICD-10-PCS | Performed by: STUDENT IN AN ORGANIZED HEALTH CARE EDUCATION/TRAINING PROGRAM

## 2024-05-31 PROCEDURE — 86901 BLOOD TYPING SEROLOGIC RH(D): CPT

## 2024-05-31 PROCEDURE — 81001 URINALYSIS AUTO W/SCOPE: CPT | Performed by: MIDWIFE

## 2024-05-31 PROCEDURE — 87086 URINE CULTURE/COLONY COUNT: CPT | Performed by: MIDWIFE

## 2024-05-31 PROCEDURE — 25810000003 LACTATED RINGERS SOLUTION: Performed by: MIDWIFE

## 2024-05-31 RX ORDER — FENTANYL CITRATE 50 UG/ML
INJECTION, SOLUTION INTRAMUSCULAR; INTRAVENOUS AS NEEDED
Status: DISCONTINUED | OUTPATIENT
Start: 2024-05-31 | End: 2024-06-05 | Stop reason: SURG

## 2024-05-31 RX ORDER — CALCIUM CARBONATE 500 MG/1
2 TABLET, CHEWABLE ORAL 3 TIMES DAILY PRN
Status: DISCONTINUED | OUTPATIENT
Start: 2024-05-31 | End: 2024-06-02 | Stop reason: HOSPADM

## 2024-05-31 RX ORDER — SODIUM CHLORIDE 9 MG/ML
40 INJECTION, SOLUTION INTRAVENOUS AS NEEDED
Status: DISCONTINUED | OUTPATIENT
Start: 2024-05-31 | End: 2024-05-31 | Stop reason: HOSPADM

## 2024-05-31 RX ORDER — EPHEDRINE SULFATE 5 MG/ML
5 INJECTION INTRAVENOUS
Status: DISCONTINUED | OUTPATIENT
Start: 2024-05-31 | End: 2024-05-31 | Stop reason: HOSPADM

## 2024-05-31 RX ORDER — LIDOCAINE HYDROCHLORIDE 10 MG/ML
0.5 INJECTION, SOLUTION INFILTRATION; PERINEURAL ONCE AS NEEDED
Status: DISCONTINUED | OUTPATIENT
Start: 2024-05-31 | End: 2024-05-31 | Stop reason: HOSPADM

## 2024-05-31 RX ORDER — OXYTOCIN/0.9 % SODIUM CHLORIDE 30/500 ML
PLASTIC BAG, INJECTION (ML) INTRAVENOUS
Status: COMPLETED
Start: 2024-05-31 | End: 2024-05-31

## 2024-05-31 RX ORDER — OXYTOCIN/0.9 % SODIUM CHLORIDE 30/500 ML
2 PLASTIC BAG, INJECTION (ML) INTRAVENOUS
Status: DISCONTINUED | OUTPATIENT
Start: 2024-05-31 | End: 2024-05-31

## 2024-05-31 RX ORDER — SODIUM CHLORIDE 0.9 % (FLUSH) 0.9 %
10 SYRINGE (ML) INJECTION EVERY 12 HOURS SCHEDULED
Status: DISCONTINUED | OUTPATIENT
Start: 2024-05-31 | End: 2024-05-31 | Stop reason: HOSPADM

## 2024-05-31 RX ORDER — ONDANSETRON 2 MG/ML
4 INJECTION INTRAMUSCULAR; INTRAVENOUS EVERY 6 HOURS PRN
Status: DISCONTINUED | OUTPATIENT
Start: 2024-05-31 | End: 2024-06-02 | Stop reason: HOSPADM

## 2024-05-31 RX ORDER — PRENATAL VIT/IRON FUM/FOLIC AC 27MG-0.8MG
1 TABLET ORAL DAILY
Status: DISCONTINUED | OUTPATIENT
Start: 2024-06-01 | End: 2024-06-02 | Stop reason: HOSPADM

## 2024-05-31 RX ORDER — ONDANSETRON 2 MG/ML
4 INJECTION INTRAMUSCULAR; INTRAVENOUS EVERY 6 HOURS PRN
Status: DISCONTINUED | OUTPATIENT
Start: 2024-05-31 | End: 2024-05-31

## 2024-05-31 RX ORDER — ONDANSETRON 4 MG/1
4 TABLET, ORALLY DISINTEGRATING ORAL ONCE AS NEEDED
Status: DISCONTINUED | OUTPATIENT
Start: 2024-05-31 | End: 2024-05-31 | Stop reason: HOSPADM

## 2024-05-31 RX ORDER — ONDANSETRON 2 MG/ML
4 INJECTION INTRAMUSCULAR; INTRAVENOUS ONCE AS NEEDED
Status: DISCONTINUED | OUTPATIENT
Start: 2024-05-31 | End: 2024-05-31 | Stop reason: HOSPADM

## 2024-05-31 RX ORDER — OXYTOCIN/0.9 % SODIUM CHLORIDE 30/500 ML
999 PLASTIC BAG, INJECTION (ML) INTRAVENOUS ONCE
Status: COMPLETED | OUTPATIENT
Start: 2024-05-31 | End: 2024-05-31

## 2024-05-31 RX ORDER — CARBOPROST TROMETHAMINE 250 UG/ML
250 INJECTION, SOLUTION INTRAMUSCULAR AS NEEDED
Status: DISCONTINUED | OUTPATIENT
Start: 2024-05-31 | End: 2024-05-31 | Stop reason: HOSPADM

## 2024-05-31 RX ORDER — SODIUM CHLORIDE 0.9 % (FLUSH) 0.9 %
1-10 SYRINGE (ML) INJECTION AS NEEDED
Status: DISCONTINUED | OUTPATIENT
Start: 2024-05-31 | End: 2024-06-02 | Stop reason: HOSPADM

## 2024-05-31 RX ORDER — PROMETHAZINE HYDROCHLORIDE 12.5 MG/1
12.5 TABLET ORAL EVERY 6 HOURS PRN
Status: DISCONTINUED | OUTPATIENT
Start: 2024-05-31 | End: 2024-05-31 | Stop reason: HOSPADM

## 2024-05-31 RX ORDER — SODIUM CHLORIDE, SODIUM LACTATE, POTASSIUM CHLORIDE, CALCIUM CHLORIDE 600; 310; 30; 20 MG/100ML; MG/100ML; MG/100ML; MG/100ML
125 INJECTION, SOLUTION INTRAVENOUS CONTINUOUS
Status: DISCONTINUED | OUTPATIENT
Start: 2024-05-31 | End: 2024-05-31

## 2024-05-31 RX ORDER — MAGNESIUM CARB/ALUMINUM HYDROX 105-160MG
30 TABLET,CHEWABLE ORAL DAILY PRN
Status: DISCONTINUED | OUTPATIENT
Start: 2024-05-31 | End: 2024-05-31

## 2024-05-31 RX ORDER — MISOPROSTOL 200 UG/1
800 TABLET ORAL AS NEEDED
Status: DISCONTINUED | OUTPATIENT
Start: 2024-05-31 | End: 2024-05-31 | Stop reason: HOSPADM

## 2024-05-31 RX ORDER — ONDANSETRON 4 MG/1
4 TABLET, ORALLY DISINTEGRATING ORAL EVERY 6 HOURS PRN
Status: DISCONTINUED | OUTPATIENT
Start: 2024-05-31 | End: 2024-06-02 | Stop reason: HOSPADM

## 2024-05-31 RX ORDER — POLYETHYLENE GLYCOL 3350 17 G/17G
17 POWDER, FOR SOLUTION ORAL DAILY PRN
Status: DISCONTINUED | OUTPATIENT
Start: 2024-05-31 | End: 2024-06-02 | Stop reason: HOSPADM

## 2024-05-31 RX ORDER — HYDROCORTISONE 25 MG/G
1 CREAM TOPICAL AS NEEDED
Status: DISCONTINUED | OUTPATIENT
Start: 2024-05-31 | End: 2024-06-02 | Stop reason: HOSPADM

## 2024-05-31 RX ORDER — DIPHENHYDRAMINE HCL 25 MG
25 CAPSULE ORAL NIGHTLY PRN
Status: DISCONTINUED | OUTPATIENT
Start: 2024-05-31 | End: 2024-06-02 | Stop reason: HOSPADM

## 2024-05-31 RX ORDER — FENTANYL CITRATE 50 UG/ML
INJECTION, SOLUTION INTRAMUSCULAR; INTRAVENOUS
Status: COMPLETED
Start: 2024-05-31 | End: 2024-05-31

## 2024-05-31 RX ORDER — METHYLERGONOVINE MALEATE 0.2 MG/ML
200 INJECTION INTRAVENOUS ONCE AS NEEDED
Status: DISCONTINUED | OUTPATIENT
Start: 2024-05-31 | End: 2024-05-31 | Stop reason: HOSPADM

## 2024-05-31 RX ORDER — ACETAMINOPHEN 325 MG/1
650 TABLET ORAL EVERY 6 HOURS PRN
Status: DISCONTINUED | OUTPATIENT
Start: 2024-05-31 | End: 2024-06-02 | Stop reason: HOSPADM

## 2024-05-31 RX ORDER — OXYTOCIN/0.9 % SODIUM CHLORIDE 30/500 ML
125 PLASTIC BAG, INJECTION (ML) INTRAVENOUS CONTINUOUS PRN
Status: DISCONTINUED | OUTPATIENT
Start: 2024-05-31 | End: 2024-06-02 | Stop reason: HOSPADM

## 2024-05-31 RX ORDER — FLUOXETINE 10 MG/1
10 CAPSULE ORAL DAILY
Status: DISCONTINUED | OUTPATIENT
Start: 2024-06-01 | End: 2024-06-02 | Stop reason: HOSPADM

## 2024-05-31 RX ORDER — MAGNESIUM CARB/ALUMINUM HYDROX 105-160MG
30 TABLET,CHEWABLE ORAL ONCE
Status: DISCONTINUED | OUTPATIENT
Start: 2024-05-31 | End: 2024-05-31

## 2024-05-31 RX ORDER — PROMETHAZINE HYDROCHLORIDE 12.5 MG/1
12.5 SUPPOSITORY RECTAL EVERY 6 HOURS PRN
Status: DISCONTINUED | OUTPATIENT
Start: 2024-05-31 | End: 2024-05-31 | Stop reason: HOSPADM

## 2024-05-31 RX ORDER — ACETAMINOPHEN 325 MG/1
650 TABLET ORAL EVERY 4 HOURS PRN
Status: DISCONTINUED | OUTPATIENT
Start: 2024-05-31 | End: 2024-05-31 | Stop reason: HOSPADM

## 2024-05-31 RX ORDER — HYDROCODONE BITARTRATE AND ACETAMINOPHEN 5; 325 MG/1; MG/1
1 TABLET ORAL EVERY 4 HOURS PRN
Status: DISCONTINUED | OUTPATIENT
Start: 2024-05-31 | End: 2024-05-31 | Stop reason: HOSPADM

## 2024-05-31 RX ORDER — OXYTOCIN/0.9 % SODIUM CHLORIDE 30/500 ML
250 PLASTIC BAG, INJECTION (ML) INTRAVENOUS CONTINUOUS
Status: ACTIVE | OUTPATIENT
Start: 2024-05-31 | End: 2024-05-31

## 2024-05-31 RX ORDER — EPHEDRINE SULFATE 5 MG/ML
10 INJECTION INTRAVENOUS
Status: DISCONTINUED | OUTPATIENT
Start: 2024-05-31 | End: 2024-05-31

## 2024-05-31 RX ORDER — PROMETHAZINE HYDROCHLORIDE 12.5 MG/1
12.5 TABLET ORAL EVERY 4 HOURS PRN
Status: DISCONTINUED | OUTPATIENT
Start: 2024-05-31 | End: 2024-06-02 | Stop reason: HOSPADM

## 2024-05-31 RX ORDER — ONDANSETRON 4 MG/1
4 TABLET, ORALLY DISINTEGRATING ORAL EVERY 6 HOURS PRN
Status: DISCONTINUED | OUTPATIENT
Start: 2024-05-31 | End: 2024-05-31 | Stop reason: HOSPADM

## 2024-05-31 RX ORDER — SODIUM CHLORIDE 0.9 % (FLUSH) 0.9 %
10 SYRINGE (ML) INJECTION AS NEEDED
Status: DISCONTINUED | OUTPATIENT
Start: 2024-05-31 | End: 2024-05-31 | Stop reason: HOSPADM

## 2024-05-31 RX ORDER — BISACODYL 10 MG
10 SUPPOSITORY, RECTAL RECTAL DAILY PRN
Status: DISCONTINUED | OUTPATIENT
Start: 2024-06-01 | End: 2024-06-02 | Stop reason: HOSPADM

## 2024-05-31 RX ORDER — LIDOCAINE HCL/EPINEPHRINE/PF 2%-1:200K
VIAL (ML) INJECTION AS NEEDED
Status: DISCONTINUED | OUTPATIENT
Start: 2024-05-31 | End: 2024-06-05 | Stop reason: SURG

## 2024-05-31 RX ORDER — DOCUSATE SODIUM 100 MG/1
100 CAPSULE, LIQUID FILLED ORAL 2 TIMES DAILY
Status: DISCONTINUED | OUTPATIENT
Start: 2024-06-01 | End: 2024-06-02 | Stop reason: HOSPADM

## 2024-05-31 RX ORDER — MORPHINE SULFATE 2 MG/ML
2 INJECTION, SOLUTION INTRAMUSCULAR; INTRAVENOUS ONCE AS NEEDED
Status: DISCONTINUED | OUTPATIENT
Start: 2024-05-31 | End: 2024-05-31 | Stop reason: HOSPADM

## 2024-05-31 RX ORDER — ACETAMINOPHEN 325 MG/1
650 TABLET ORAL ONCE AS NEEDED
Status: DISCONTINUED | OUTPATIENT
Start: 2024-05-31 | End: 2024-05-31 | Stop reason: HOSPADM

## 2024-05-31 RX ORDER — IBUPROFEN 600 MG/1
600 TABLET ORAL EVERY 6 HOURS PRN
Status: DISCONTINUED | OUTPATIENT
Start: 2024-05-31 | End: 2024-06-02 | Stop reason: HOSPADM

## 2024-05-31 RX ORDER — LIDOCAINE HYDROCHLORIDE 10 MG/ML
INJECTION, SOLUTION INFILTRATION; PERINEURAL AS NEEDED
Status: DISCONTINUED | OUTPATIENT
Start: 2024-05-31 | End: 2024-06-05 | Stop reason: SURG

## 2024-05-31 RX ADMIN — Medication 2 MILLI-UNITS/MIN: at 09:30

## 2024-05-31 RX ADMIN — SODIUM CHLORIDE, POTASSIUM CHLORIDE, SODIUM LACTATE AND CALCIUM CHLORIDE 1000 ML: 600; 310; 30; 20 INJECTION, SOLUTION INTRAVENOUS at 12:42

## 2024-05-31 RX ADMIN — LIDOCAINE HYDROCHLORIDE,EPINEPHRINE BITARTRATE 3 ML: 20; .005 INJECTION, SOLUTION EPIDURAL; INFILTRATION; INTRACAUDAL; PERINEURAL at 13:26

## 2024-05-31 RX ADMIN — LIDOCAINE HYDROCHLORIDE 3 ML: 10 INJECTION, SOLUTION INFILTRATION; PERINEURAL at 13:20

## 2024-05-31 RX ADMIN — Medication 250 ML/HR: at 20:56

## 2024-05-31 RX ADMIN — SODIUM CHLORIDE, POTASSIUM CHLORIDE, SODIUM LACTATE AND CALCIUM CHLORIDE 125 ML/HR: 600; 310; 30; 20 INJECTION, SOLUTION INTRAVENOUS at 08:54

## 2024-05-31 RX ADMIN — Medication 12 ML/HR: at 13:29

## 2024-05-31 RX ADMIN — FENTANYL CITRATE 50 MCG: 50 INJECTION, SOLUTION INTRAMUSCULAR; INTRAVENOUS at 13:29

## 2024-05-31 RX ADMIN — Medication 999 ML/HR: at 20:39
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
## 2024-05-31 NOTE — PROGRESS NOTES
Mekhi Simon  : 2005  MRN: [id number]  CSN: [id number]    Labor progress note    Subjective   She reports fluid leakage and not feeling any ctxs.     Objective   Min/max vitals past 24 hours:  Temp  Min: 98.5 °F (36.9 °C)  Max: 98.5 °F (36.9 °C)   BP  Min: 86/67  Max: 129/86   Pulse  Min: 50  Max: 65   Resp  Min: 16  Max: 16        FHT's: reassuring, reactive, and category 1   Cervix: was checked (by me): 2 cm / 80 % / -2, AROM forebag   Contractions: irregular - external monitors used Pitocin @ 8 mu      Assessment   IUP at 37w5d  Fetal status reassuring     Plan   Continue with augmentation    Erika Vasques, APRN  2024  11:58 EDT

## 2024-05-31 NOTE — PROGRESS NOTES
"In to see patient after repeat epidural. Pain is much better controlled.     /75   Pulse 53   Temp 98.5 °F (36.9 °C) (Oral)   Resp 16   Ht 157.5 cm (62\")   Wt 90.9 kg (200 lb 6.4 oz)   LMP 2023 (Exact Date)   SpO2 99%   Breastfeeding Yes   BMI 36.65 kg/m²    Gen: well appearing  CVX: C/C/0    FHTs: 110s, mod variability, +accels, early/ variable decels with CTX and pushing efforts  TOCO: CTX q 3 min    A/P:  19 y/o P0 at 37w5d presenting with PROM, undergoing AOL with pitocin (pitocin recently paused due to recurrent late decels).  - Begin pushing  - May restart pitocin if CTX space out  - Anticipate HAFSA Sharif MD   Obstetrics and Gynecology  [facility]   "

## 2024-05-31 NOTE — ANESTHESIA PROCEDURE NOTES
Labor Epidural      Patient reassessed immediately prior to procedure    Patient location during procedure: OB  Start Time: 5/31/2024 5:35 PM  Performed By  CRNA/CAA: Mio Painter CRNA  Preanesthetic Checklist  Completed: patient identified, IV checked, site marked, risks and benefits discussed, surgical consent, monitors and equipment checked, pre-op evaluation and timeout performed  Additional Notes  Risks discussed , including but not limited to:  Headache, itching, n&v, infection, failure, decreased blood pressure, permanent chronic/back pain, nerve damage, paralysis, etc. All questions answered and informed consent obtained. Skin localized with lidocaine skin wheel. Test dose 2+3 ml of 1.5% lidocaine with 1:200,000 epi.  Prep:  Pt Position:sitting  Sterile Tech:cap, gloves, mask and sterile barrier  Prep:chlorhexidine gluconate and isopropyl alcohol  Monitoring:blood pressure monitoring and continuous pulse oximetry  Epidural Block Procedure:  Approach:midline  Guidance:landmark technique and palpation technique  Location:L3-L4  Needle Type:Tuohy  Needle Gauge:18 G  Loss of Resistance Medium: air  Loss of Resistance: 7cm  Cath Depth at skin:14 cm  Paresthesia: none  Aspiration:negative  Test Dose:negative  Number of Attempts: 1  Post Assessment:  Dressing:occlusive dressing applied and secured with tape  Pt Tolerance:patient tolerated the procedure well with no apparent complications  Complications:no

## 2024-05-31 NOTE — ANESTHESIA PROCEDURE NOTES
Epidural Block      Start Time: 5/31/2024 1:20 PM  Stop Time: 5/31/2024 1:26 PM  Performed By  CRNA/CAA: Makayla Anthony CRNA  Preanesthetic Checklist  Completed: patient identified, IV checked, site marked, risks and benefits discussed, surgical consent, monitors and equipment checked, pre-op evaluation and timeout performed  Additional Notes  Sitting, SOB.  LR bolus infusing./  Std OB monitors. VSS. L3-4 Id'd.  S/P/D x 3 betadine swabs.  Local infiltrate Lidocaine 1% 3 cc.  #18 g Touey to L3-4 x 1 stick.  +TORITO at 8 cm.  Cath passed to 15 cm, - heme, -parathesia, flushed with saline without difficulty.  Lidocaine test dose 2% with epi 1: 200:000.  - Test dose.  Dosed with 100 mcg fentanyl and 8 cc Ropivicaine 0.2% with fentanyl.  Rate at 12hr.  Pt tolerated well.  Prep:  Sterile Tech:cap, gloves, mask and sterile barrier  Prep:chlorhexidine gluconate and isopropyl alcohol  Monitoring:blood pressure monitoring, continuous pulse oximetry and EKG  Epidural Block Procedure:  Approach:midline  Guidance:landmark technique and palpation technique  Location:lumbar  Level:3-4  Needle Type:Tuohy  Needle Gauge:18 G  Loss of Resistance Medium: saline  Loss of Resistance: 9cm  Cath Depth at skin:15 cm  Paresthesia: none  Aspiration:negative  Test Dose:negative  Post Assessment:  Dressing:biopatch applied, occlusive dressing applied and secured with tape  Pt Tolerance:patient tolerated the procedure well with no apparent complications and no signs or symtoms of SAB or intravascular injection  Complications:no

## 2024-05-31 NOTE — ANESTHESIA PREPROCEDURE EVALUATION
Anesthesia Evaluation     Patient summary reviewed and Nursing notes reviewed   no history of anesthetic complications:   NPO Solid Status: > 8 hours  NPO Liquid Status: > 8 hours           Airway   Mallampati: II  TM distance: >3 FB  Neck ROM: full  no difficulty expected and Possible difficult intubation  Dental - normal exam     Pulmonary - negative pulmonary ROS and normal exam   (+) asthma,  Cardiovascular - negative cardio ROS and normal exam    (+) CAD      Neuro/Psych- negative ROS  (+) psychiatric history Anxiety and Depression  GI/Hepatic/Renal/Endo - negative ROS     Musculoskeletal (-) negative ROS    Abdominal    Substance History - negative use     OB/GYN negative ob/gyn ROS   (+) Pregnant        Other - negative ROS                   Anesthesia Plan    ASA 2 - emergent     epidural     (Risks of epidural analgesia discussed, including but not limited to:  Headache, itching, n&v, infection, failure, decreased bp, decreased fetal hr, possible c/s. Permanent chronic back pain, nerve damage, paralysis, etc.    Patient told that epidural analgesia may not relieve all the labor pain, that she may still feel pressure and that she may still feel pain as the baby is close to delivery.     All questions answered and informed consent obtained.    )    Anesthetic plan, risks, benefits, and alternatives have been provided, discussed and informed consent has been obtained with: patient.    CODE STATUS:    Code Status (Patient has no pulse and is not breathing): CPR (Attempt to Resuscitate)  Medical Interventions (Patient has pulse or is breathing): Full Support

## 2024-06-01 LAB
BACTERIA SPEC AEROBE CULT: NORMAL
BASOPHILS # BLD AUTO: 0.03 10*3/MM3 (ref 0–0.2)
BASOPHILS NFR BLD AUTO: 0.2 % (ref 0–1.5)
DEPRECATED RDW RBC AUTO: 44.2 FL (ref 37–54)
EOSINOPHIL # BLD AUTO: 0.03 10*3/MM3 (ref 0–0.4)
EOSINOPHIL NFR BLD AUTO: 0.2 % (ref 0.3–6.2)
ERYTHROCYTE [DISTWIDTH] IN BLOOD BY AUTOMATED COUNT: 13.4 % (ref 12.3–15.4)
HCT VFR BLD AUTO: 32.9 % (ref 34–46.6)
HGB BLD-MCNC: 10.7 G/DL (ref 12–15.9)
IMM GRANULOCYTES # BLD AUTO: 0.1 10*3/MM3 (ref 0–0.05)
IMM GRANULOCYTES NFR BLD AUTO: 0.8 % (ref 0–0.5)
LYMPHOCYTES # BLD AUTO: 2.72 10*3/MM3 (ref 0.7–3.1)
LYMPHOCYTES NFR BLD AUTO: 20.4 % (ref 19.6–45.3)
MCH RBC QN AUTO: 29.5 PG (ref 26.6–33)
MCHC RBC AUTO-ENTMCNC: 32.5 G/DL (ref 31.5–35.7)
MCV RBC AUTO: 90.6 FL (ref 79–97)
MONOCYTES # BLD AUTO: 1.08 10*3/MM3 (ref 0.1–0.9)
MONOCYTES NFR BLD AUTO: 8.1 % (ref 5–12)
NEUTROPHILS NFR BLD AUTO: 70.3 % (ref 42.7–76)
NEUTROPHILS NFR BLD AUTO: 9.37 10*3/MM3 (ref 1.7–7)
NRBC BLD AUTO-RTO: 0 /100 WBC (ref 0–0.2)
PLATELET # BLD AUTO: 237 10*3/MM3 (ref 140–450)
PMV BLD AUTO: 11.5 FL (ref 6–12)
RBC # BLD AUTO: 3.63 10*6/MM3 (ref 3.77–5.28)
WBC NRBC COR # BLD AUTO: 13.33 10*3/MM3 (ref 3.4–10.8)

## 2024-06-01 PROCEDURE — 85025 COMPLETE CBC W/AUTO DIFF WBC: CPT | Performed by: STUDENT IN AN ORGANIZED HEALTH CARE EDUCATION/TRAINING PROGRAM

## 2024-06-01 PROCEDURE — 0503F POSTPARTUM CARE VISIT: CPT | Performed by: STUDENT IN AN ORGANIZED HEALTH CARE EDUCATION/TRAINING PROGRAM

## 2024-06-01 RX ORDER — FERROUS SULFATE 324(65)MG
324 TABLET, DELAYED RELEASE (ENTERIC COATED) ORAL 2 TIMES DAILY WITH MEALS
Status: DISCONTINUED | OUTPATIENT
Start: 2024-06-01 | End: 2024-06-02 | Stop reason: HOSPADM

## 2024-06-01 RX ADMIN — IBUPROFEN 600 MG: 600 TABLET ORAL at 20:30

## 2024-06-01 RX ADMIN — IBUPROFEN 600 MG: 600 TABLET ORAL at 08:32

## 2024-06-01 RX ADMIN — PRAMOXINE HYDROCHLORIDE AND HYDROCORTISONE ACETATE 1 APPLICATION: 100; 100 AEROSOL, FOAM TOPICAL at 08:32

## 2024-06-01 RX ADMIN — DOCUSATE SODIUM 100 MG: 100 CAPSULE, LIQUID FILLED ORAL at 20:18

## 2024-06-01 RX ADMIN — Medication: at 00:33

## 2024-06-01 RX ADMIN — FLUOXETINE 10 MG: 10 CAPSULE ORAL at 08:32

## 2024-06-01 RX ADMIN — PRENATAL VITAMINS-IRON FUMARATE 27 MG IRON-FOLIC ACID 0.8 MG TABLET 1 TABLET: at 08:32

## 2024-06-01 RX ADMIN — FERROUS SULFATE TAB EC 324 MG (65 MG FE EQUIVALENT) 324 MG: 324 (65 FE) TABLET DELAYED RESPONSE at 17:30

## 2024-06-01 RX ADMIN — DOCUSATE SODIUM 100 MG: 100 CAPSULE, LIQUID FILLED ORAL at 08:32

## 2024-06-01 RX ADMIN — DOCUSATE SODIUM 100 MG: 100 CAPSULE, LIQUID FILLED ORAL at 00:33

## 2024-06-01 NOTE — PROGRESS NOTES
Mekhi Simon  : 2005  MRN: [id number]  CSN: [id number]    Postpartum Day #1  Subjective   Her pain is moderately well controlled - she reports feeling sore at her perineum. Vaginal bleeding is appropriate. She is tolerating oral intake. She is ambulatory. She is voiding spontaneously. She is breast and bottle feeding and reports baby may have a tongue tie.     Objective     Min/max vitals past 24 hours:   Temp  Min: 97.9 °F (36.6 °C)  Max: 98.3 °F (36.8 °C)  BP  Min: 97/63  Max: 160/118  Pulse  Min: 45  Max: 154  Resp  Min: 16  Max: 18        General: Well developed, well nourished and in no acute distress   Abdomen: Soft, non-tender, no masses and fundus firm and non-tender   Pelvic: Not performed   Ext: Non-tender, +1 pitting edema     Lab Results   Component Value Date    WBC 13.33 (H) 2024    HGB 10.7 (L) 2024    HCT 32.9 (L) 2024    MCV 90.6 2024     2024    RUBELLAABIGG 3.76 2023    HEPBSAG Negative 2023        Assessment & Plan    Postpartum Day #1 S/P spontaneous vaginal delivery  Continue routine postpartum care  Encourage ambulation  Breastfeeding support PRN  Iron ordered due to mild anemia  Mild range BP intrapartum in the setting of poor pain control, normotensive since delivery. Continue to monitor.    Marcela Sharif MD  2024  12:13 EDT

## 2024-06-01 NOTE — PLAN OF CARE
Goal Outcome Evaluation:              Outcome Evaluation: VSS, adequate I/O, pain managed with ordered medication, lochia WDL, positive bonding with baby observed

## 2024-06-01 NOTE — L&D DELIVERY NOTE
[facility]  Vaginal Delivery Note    Delivery Details   Delivery: Vaginal, Spontaneous     YOB: 2026    Time of birth: 8:36 PM    Anesthesia Epidural     Delivering clinician: Marcela Sharif MD    Forceps: No   Vacuum: No   Shoulder dystocia: No     Delivery narrative: The patient progressed in labor to 10/100/+1 and began pushing to spontaneously deliver a viable female infant. The infant was placed on the mother's abdomen and the mouth and nose were bulb suctioned. Cord clamping was delayed 60 seconds. The placenta was delivered in standard fashion and was found to be intact. Uterine tone and bleeding were adequate with massage and Pitocin. Lacerations were inspected and repaired as detailed below.    Infant Details   Findings: Female  infant   Infant observations: Weight: 3140 g (6 lb 14.8 oz)    Length: 19.25  in   Apgars: 8  @ 1 minute /    9  @ 5 minutes     Placenta, Cord, and Fluid   Placenta delivered:  Spontaneous  at   5/31/2024  8:39 PM     Cord: 3 vessels  present.   Nuchal cord: Yes, Nuchal/ body cord; Number of nuchal loops present:  1   Cord blood obtained: Yes     Repair    Episiotomy: No    Lacerations: Yes  Laceration Information  Laceration Repaired?   Perineal:       Periurethral:       Labial: right  Yes , repaired with 3-0 Vicryl in an interrupted fashion   Sulcus:       Vaginal: Yes  Yes , repaired with 3-0 Vicryl in an interrupted fashion with [id number] for hemostasis.   Cervical:         Suture used for repair: 3-0 Vicryl   Estimated blood loss: 350cc     Complications  None    Disposition  Mother to Mother Baby/Postpartum in stable condition currently.  Baby remains with mom in stable condition currently.    Marcela Sharif MD  05/31/24  21:15 EDT

## 2024-06-01 NOTE — PAYOR COMM NOTE
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  Discontinued         05/31/24 2040 05/31/24 2039  carboprost (HEMABATE) injection 250 mcg  As Needed,   Status:  Discontinued         05/31/24 2040 05/31/24 2039  miSOPROStol (CYTOTEC) tablet 800 mcg  As Needed,   Status:  Discontinued         05/31/24 2040 05/31/24 2039  ondansetron ODT (ZOFRAN-ODT) disintegrating tablet 4 mg  Once As Needed,   Status:  Discontinued        Placed in \"Or\" " "Linked Group    05/31/24 2040 05/31/24 2039  ondansetron (ZOFRAN) injection 4 mg  Once As Needed,   Status:  Discontinued        Placed in \"Or\" Linked Group    05/31/24 2040 05/31/24 2039  promethazine (PHENERGAN) tablet 12.5 mg  Every 6 Hours PRN,   Status:  Discontinued         05/31/24 2040 05/31/24 2039  Apply Ice to Perineum  As Needed,   Status:  Canceled       05/31/24 2040 05/31/24 2039  Bladder Assessment  As Needed,   Status:  Canceled       05/31/24 2040 05/31/24 2039  acetaminophen (TYLENOL) tablet 650 mg  Once As Needed,   Status:  Discontinued         05/31/24 2040 05/31/24 2039  HYDROcodone-acetaminophen (NORCO) 5-325 MG per tablet 1 tablet  Every 4 Hours PRN,   Status:  Discontinued         05/31/24 2040 05/31/24 2039  Morphine sulfate (PF) injection 2 mg  Once As Needed,   Status:  Discontinued         05/31/24 2040 05/31/24 2039  morphine injection 4 mg  Once As Needed,   Status:  Discontinued         05/31/24 2040 05/31/24 1904  mineral oil liquid 30 mL  Daily PRN,   Status:  Discontinued         05/31/24 1905    05/31/24 1500  lactated ringers bolus 1,000 mL  Once,   Status:  Discontinued         05/31/24 1400 05/31/24 1400  Vital Signs Per Anesthesia Guidelines  Per Order Details,   Status:  Canceled        Comments: Every 2 Minutes x5, Every 5 Minutes x4, Then If Stable Every 15 Minutes    05/31/24 1400    05/31/24 1400  Start IV with #16 or #18 gauge angiocath.  Once,   Status:  Canceled         05/31/24 1400    05/31/24 1400  Fetal Heart Rate Monitor  Once,   Status:  Canceled         05/31/24 1400    05/31/24 1400  Nurse or anesthesiologist to remain with patient for 15 minutes following dosing.  Continuous,   Status:  Canceled         05/31/24 1400    05/31/24 1400  Facilitate maternal postion on side and maintain uterine displacement.  Continuous,   Status:  Canceled        Comments: HOB 30 Degrees    05/31/24 1400    05/31/24 1400  Notify physician for the " following conditions:  Until Discontinued,   Status:  Canceled         05/31/24 1400    05/31/24 1359  ePHEDrine Sulfate (Pressors) 5 MG/ML injection 5 mg  Every 10 Minutes PRN,   Status:  Discontinued         05/31/24 1400    05/31/24 1301  fentaNYL citrate (PF) (SUBLIMAZE) 50 mcg/mL injection  - ADS Override Pull        Note to Pharmacy: Created by cabinet override    05/31/24 1301    05/31/24 1200  Vital Signs q 4 while awake  Every 4 Hours,   Status:  Canceled      Comments: While the patient is awake.    05/31/24 0850    05/31/24 1100  lactated ringers bolus 1,000 mL  Once,   Status:  Discontinued         05/31/24 1006    05/31/24 1100  fentaNYL 2mcg/mL and ropivacaine 0.2% in NS epidural 100 mL  Continuous,   Status:  Discontinued         05/31/24 1006    05/31/24 1030  mineral oil liquid 30 mL  Once,   Status:  Discontinued         05/31/24 0935    05/31/24 1015  oxytocin (PITOCIN) 30 units in 0.9% sodium chloride 500 mL (premix)  Titrated,   Status:  Discontinued         05/31/24 0925    05/31/24 1007  Vital Signs Per Anesthesia Guidelines  Per Order Details,   Status:  Canceled        Comments: Every 2 Minutes x5, Every 5 Minutes x4, Then If Stable Every 15 Minutes    05/31/24 1006    05/31/24 1007  Start IV (16 or 18 Gauge)  Once,   Status:  Canceled         05/31/24 1006    05/31/24 1007  Fetal Heart Rate Monitor  Once,   Status:  Canceled         05/31/24 1006    05/31/24 1007  Nurse or Anesthesiologist to Remain With Patient for 15 Minutes Following Dosing  Continuous,   Status:  Canceled         05/31/24 1006    05/31/24 1007  Facilitate Maternal Position on Side & Maintain Uterine Displacement  Continuous,   Status:  Canceled         05/31/24 1006    05/31/24 1007  Consult Anesthesia Prior to Changing Epidural Infusion / Rate  Continuous,   Status:  Canceled         05/31/24 1006    05/31/24 1007  Notify Provider  Continuous,   Status:  Canceled        Comments: Open Order Report to View Parameters  Requiring Provider Notification    05/31/24 1006    05/31/24 1006  ePHEDrine Sulfate (Pressors) 5 MG/ML injection 10 mg  Every 10 Minutes PRN,   Status:  Discontinued         05/31/24 1006    05/31/24 0945  sodium chloride 0.9 % flush 10 mL  Every 12 Hours Scheduled,   Status:  Discontinued         05/31/24 0846    05/31/24 0945  sodium chloride 0.9 % flush 10 mL  Every 12 Hours Scheduled,   Status:  Discontinued         05/31/24 0850    05/31/24 0945  lactated ringers bolus 1,000 mL  Once         05/31/24 0850    05/31/24 0945  lactated ringers infusion  Continuous,   Status:  Discontinued         05/31/24 0850    05/31/24 0936  Patient May Have Epidural  Once,   Status:  Canceled         05/31/24 0935    05/31/24 0920  Urinalysis, Microscopic Only - Urine, Clean Catch  Once         05/31/24 0919    05/31/24 0920  Urine Culture - Urine, Urine, Clean Catch  Once         05/31/24 0919    05/31/24 0855  ABO RH Specimen Verification  STAT         05/31/24 0854    05/31/24 0851  Admit To Obstetrics Inpatient  Once         05/31/24 0850    05/31/24 0851  Code Status and Medical Interventions:  Continuous,   Status:  Canceled         05/31/24 0850    05/31/24 0851  Obtain informed consent  Once,   Status:  Canceled         05/31/24 0850    05/31/24 0851  Place Sequential Compression Device  Once,   Status:  Canceled         05/31/24 0850    05/31/24 0851  Maintain Sequential Compression Device  Continuous,   Status:  Canceled         05/31/24 0850    05/31/24 0851  Vital Signs Per Hospital Policy  Per Hospital Policy,   Status:  Canceled         05/31/24 0850    05/31/24 0851  Confirm presence of amniotic fluid  Once,   Status:  Canceled        Comments: If patient present with SROM    05/31/24 0850    05/31/24 0851  Keep exams to a minimum on patients with SROM  Continuous,   Status:  Canceled         05/31/24 0850    05/31/24 0851  Mini- prep prior to delivery  Once,   Status:  Canceled         05/31/24 0850    05/31/24  0851  Continuous Fetal Monitoring With NST on Admission and Prior to Initiation of Oxytocin.  Per Order Details,   Status:  Canceled        Comments: Continuous Fetal Monitoring With NST on Admission & Prior to Initiation of Oxytocin.    2450    24  External Uterine Contraction Monitoring  Per Hospital Policy,   Status:  Canceled         24 0850    24  Notify Physician (specified)  Until Discontinued,   Status:  Canceled         24 0850    24  Notify physician for tachysystole (per hospital algorithm)  Until Discontinued,   Status:  Canceled         24 0850    24  Notify physician if membranes ruptured, bleeding greater than 1 pad an hour, fetal heart tone abnormality, and severe pain  Until Discontinued,   Status:  Canceled         2450    24  Bed Rest with Bathroom Privileges  Once,   Status:  Canceled        Comments: If membrane intact, or head engaged with ruptured BOW or if tracing was normal for 20 minutes.    2450    24  Cervical Exam  Once,   Status:  Canceled        Comments: Unless contraindicated, every 1-2 hours in active labor, or at nurse's discretion.    2450    24  Initiate Group Beta Strep (GBS) Prophylaxis Protocol, If Criteria Met  Continuous,   Status:  Canceled        Comments: NO TREATMENT RECOMMENDED IF: 1)  Maternal GBS status known negative 2)  Scheduled  birth with intact membranes, not in labor.  3 ) Maternal GBS unknown, no risk factors.   TREAT WITH ANTIBIOTICS IF:  1)  Maternal GBS status is known postive.  2)  Maternal GBS status unknown with these risk factors:  a)  Previous infant affected by GBS infection.  b)  GBS urinary tract infection (UTI) or bacteruria during pregnancy  c)  Unexplained maternal fever in labor (greater than or equal to 100.4F or 38.0C)  d)  Prolonged rupture of the membranes greater than or equal to 18 hours.  e)   "Gestational age less than 37 weeks.    05/31/24 0850    05/31/24 0851  NPO Diet NPO Type: Ice Chips  Diet Effective Now,   Status:  Canceled         05/31/24 0850    05/31/24 0851  T Pallidum Antibody w/ reflex RPR (Syphilis)  Once         05/31/24 0850    05/31/24 0851  CBC & Differential  Once         05/31/24 0850    05/31/24 0851  POC Urinalysis Dipstick  Once,   Status:  Canceled         05/31/24 0850    05/31/24 0851  Type & Screen  Once         05/31/24 0850    05/31/24 0851  Insert Peripheral IV  Once,   Status:  Canceled         05/31/24 0850    05/31/24 0851  Saline Lock & Maintain IV Access  Continuous,   Status:  Canceled         05/31/24 0850    05/31/24 0851  CBC Auto Differential  PROCEDURE ONCE         05/31/24 0850    05/31/24 0850  acetaminophen (TYLENOL) tablet 650 mg  Every 4 Hours PRN,   Status:  Discontinued         05/31/24 0850    05/31/24 0850  morphine injection 4 mg  Every 4 Hours PRN,   Status:  Discontinued         05/31/24 0850    05/31/24 0850  morphine injection 6 mg  Every 4 Hours PRN,   Status:  Discontinued         05/31/24 0850    05/31/24 0850  ondansetron ODT (ZOFRAN-ODT) disintegrating tablet 4 mg  Every 6 Hours PRN,   Status:  Discontinued        Placed in \"Or\" Linked Group    05/31/24 0850    05/31/24 0850  ondansetron (ZOFRAN) injection 4 mg  Every 6 Hours PRN,   Status:  Discontinued        Placed in \"Or\" Linked Group    05/31/24 0850    05/31/24 0850  promethazine (PHENERGAN) suppository 12.5 mg  Every 6 Hours PRN,   Status:  Discontinued        Placed in \"Or\" Linked Group    05/31/24 0850    05/31/24 0850  promethazine (PHENERGAN) tablet 12.5 mg  Every 6 Hours PRN,   Status:  Discontinued        Placed in \"Or\" Linked Group    05/31/24 0850    05/31/24 0850  Position change  As Needed,   Status:  Canceled      Comments: For intra-uterine resuscitation for hypertonus, hypertstimulation, or non-reassuring fetal status    05/31/24 0850    05/31/24 0850  sodium chloride 0.9 % " flush 10 mL  As Needed,   Status:  Discontinued         05/31/24 0850    05/31/24 0850  sodium chloride 0.9 % infusion 40 mL  As Needed,   Status:  Discontinued         05/31/24 0850    05/31/24 0850  lidocaine (XYLOCAINE) 1 % injection 0.5 mL  Once As Needed,   Status:  Discontinued         05/31/24 0850    05/31/24 0848  Rapid Assay For ROM - Amniotic Fluid, Amniotic Sac  Once         05/31/24 0847    05/31/24 0847  Measure Blood Pressure  Per Hospital Policy,   Status:  Canceled         05/31/24 0846    05/31/24 0847  Measure Heart Rate  Per Hospital Policy,   Status:  Canceled         05/31/24 0846    05/31/24 0847  Check Temperature  Per Hospital Policy,   Status:  Canceled         05/31/24 0846    05/31/24 0847  Check Respiratory Rate  Per Order Details,   Status:  Canceled        Comments: Per Hospital Policy    05/31/24 0846    05/31/24 0847  Continuous Fetal Monitoring With NST on Admission and Prior to Initiation of Oxytocin.  Per Order Details,   Status:  Canceled        Comments: Continuous Fetal Monitoring With NST on Admission & Prior to Initiation of Oxytocin.    05/31/24 0846    05/31/24 0847  External Uterine Contraction Monitoring  Per Hospital Policy,   Status:  Canceled         05/31/24 0846    05/31/24 0847  Bed Rest With Bathroom Privileges  Once,   Status:  Canceled         05/31/24 0846    05/31/24 0847  Notify Provider  Until Discontinued,   Status:  Canceled         05/31/24 0846    05/31/24 0847  Cervical Check  Once,   Status:  Canceled        Comments: If patient is > 36 weeks and presents with complaints of labor.    05/31/24 0846    05/31/24 0847  Collect Amnisure And Send To Lab  Continuous,   Status:  Canceled        Comments: If patient presents with complaints of ruptured membranes and not grossly ruptured.    05/31/24 0846    05/31/24 0847  Notify Provider  Until Discontinued,   Status:  Canceled        Comments: Vaginal Bleeding In Patients < 36 Weeks, Category II Or III Fetal  Heart Rate Tracing, Tachysystole, Or Pain Unrelated To Uterine Contractions    05/31/24 0846    05/31/24 0847  NPO Diet NPO Type: Ice Chips  Diet Effective Now,   Status:  Canceled         05/31/24 0846    05/31/24 0847  POC Urinalysis Dipstick  Once,   Status:  Canceled         05/31/24 0846    05/31/24 0847  Oxygen Therapy- Non-Rebreather Mask; 12 LPM  Continuous,   Status:  Canceled        Comments: In patients presenting in respiratory distress related to cardiopulmonary illness or disease OR in the presence of a non-reassuring fetal heart rate tracing.    05/31/24 0846    05/31/24 0847  Insert Peripheral IV  Once,   Status:  Canceled        Comments: Patient Presents With Signs / Symptoms of Shock or Acute Active Bleeding OR Chest Pain / Pressure With Shortness of Breath, Nausea, Vomiting, Diaphoresis    05/31/24 0846    05/31/24 0847  Saline Lock & Maintain IV Access  Continuous,   Status:  Canceled         05/31/24 0846    05/31/24 0847  Urinalysis With Culture If Indicated - Urine, Clean Catch  Once         05/31/24 0847    05/31/24 0846  sodium chloride 0.9 % flush 10 mL  As Needed,   Status:  Discontinued         05/31/24 0846    05/31/24 0846  sodium chloride 0.9 % infusion 40 mL  As Needed,   Status:  Discontinued         05/31/24 0846    05/31/24 0846  lidocaine (XYLOCAINE) 1 % injection 0.5 mL  Once As Needed,   Status:  Discontinued         05/31/24 0846    Unscheduled  Apply witch hazel pads / TUCKS to perineum as needed for comfort PRN  As Needed       05/31/24 2335    Unscheduled  Warm compress  As Needed       05/31/24 2335    Unscheduled  Apply ace wrap, tight bra, or binder  As Needed       05/31/24 2335    Unscheduled  Apply ice packs  As Needed       05/31/24 2335                     Physician Progress Notes (last 48 hours)        Marcela Sharif MD at 05/31/24 1808          In to see patient after repeat epidural. Pain is much better controlled.     /75   Pulse 53   Temp 98.5 °F  "(36.9 °C) (Oral)   Resp 16   Ht 157.5 cm (62\")   Wt 90.9 kg (200 lb 6.4 oz)   LMP 2023 (Exact Date)   SpO2 99%   Breastfeeding Yes   BMI 36.65 kg/m²    Gen: well appearing  CVX: C/C/0    FHTs: 110s, mod variability, +accels, early/ variable decels with CTX and pushing efforts  TOCO: CTX q 3 min    A/P:  17 y/o P0 at 37w5d presenting with PROM, undergoing AOL with pitocin (pitocin recently paused due to recurrent late decels).  - Begin pushing  - May restart pitocin if CTX space out  - Anticipate     Marcela Sharif MD   Obstetrics and Gynecology  [facility]     Electronically signed by Marcela Sharif MD at 24 1838       Consult Notes (last 48 hours)  Notes from 24 0925 through 24 0925   No notes of this type exist for this encounter.       "

## 2024-06-02 VITALS
TEMPERATURE: 98 F | WEIGHT: 200.4 LBS | HEART RATE: 74 BPM | OXYGEN SATURATION: 96 % | RESPIRATION RATE: 16 BRPM | HEIGHT: 62 IN | BODY MASS INDEX: 36.88 KG/M2 | SYSTOLIC BLOOD PRESSURE: 113 MMHG | DIASTOLIC BLOOD PRESSURE: 68 MMHG

## 2024-06-02 PROCEDURE — 90715 TDAP VACCINE 7 YRS/> IM: CPT | Performed by: MIDWIFE

## 2024-06-02 PROCEDURE — 0503F POSTPARTUM CARE VISIT: CPT | Performed by: STUDENT IN AN ORGANIZED HEALTH CARE EDUCATION/TRAINING PROGRAM

## 2024-06-02 PROCEDURE — 25010000002 TETANUS-DIPHTH-ACELL PERTUSSIS 5-2.5-18.5 LF-MCG/0.5 SUSPENSION PREFILLED SYRINGE: Performed by: MIDWIFE

## 2024-06-02 PROCEDURE — 90471 IMMUNIZATION ADMIN: CPT | Performed by: MIDWIFE

## 2024-06-02 RX ORDER — POLYETHYLENE GLYCOL 3350 17 G/17G
17 POWDER, FOR SOLUTION ORAL DAILY
Qty: 30 EACH | Refills: 1 | Status: SHIPPED | OUTPATIENT
Start: 2024-06-02

## 2024-06-02 RX ORDER — IBUPROFEN 800 MG/1
800 TABLET ORAL EVERY 6 HOURS PRN
Qty: 30 TABLET | Refills: 0 | Status: SHIPPED | OUTPATIENT
Start: 2024-06-02

## 2024-06-02 RX ORDER — ACETAMINOPHEN AND CODEINE PHOSPHATE 120; 12 MG/5ML; MG/5ML
1 SOLUTION ORAL DAILY
Qty: 28 TABLET | Refills: 12 | Status: SHIPPED | OUTPATIENT
Start: 2024-06-02 | End: 2025-06-02

## 2024-06-02 RX ORDER — DOCUSATE SODIUM 100 MG/1
100 CAPSULE, LIQUID FILLED ORAL 2 TIMES DAILY
Qty: 60 CAPSULE | Refills: 1 | Status: SHIPPED | OUTPATIENT
Start: 2024-06-02

## 2024-06-02 RX ORDER — ACETAMINOPHEN 500 MG
1000 TABLET ORAL EVERY 6 HOURS PRN
Qty: 60 TABLET | Refills: 0 | Status: SHIPPED | OUTPATIENT
Start: 2024-06-02

## 2024-06-02 RX ADMIN — TETANUS TOXOID, REDUCED DIPHTHERIA TOXOID AND ACELLULAR PERTUSSIS VACCINE, ADSORBED 0.5 ML: 5; 2.5; 8; 8; 2.5 SUSPENSION INTRAMUSCULAR at 09:27

## 2024-06-02 RX ADMIN — DOCUSATE SODIUM 100 MG: 100 CAPSULE, LIQUID FILLED ORAL at 08:01

## 2024-06-02 RX ADMIN — FERROUS SULFATE TAB EC 324 MG (65 MG FE EQUIVALENT) 324 MG: 324 (65 FE) TABLET DELAYED RESPONSE at 08:01

## 2024-06-02 RX ADMIN — PRENATAL VITAMINS-IRON FUMARATE 27 MG IRON-FOLIC ACID 0.8 MG TABLET 1 TABLET: at 08:01

## 2024-06-02 RX ADMIN — FLUOXETINE 10 MG: 10 CAPSULE ORAL at 08:01

## 2024-06-02 NOTE — PLAN OF CARE
Goal Outcome Evaluation:  Discharging today per MD order. Pt provided with resource packet for discharge.

## 2024-06-02 NOTE — PROGRESS NOTES
Mekhi Simon  : 2005  MRN: [id number]  CSN: [id number]    Postpartum Day #2  Subjective   Her pain is well controlled. Vaginal bleeding is appropriate. She is tolerating oral intake. She is ambulatory. She is voiding spontaneously. She is breastfeeding without concern. She plans to start POPs for contraception.     Objective     Min/max vitals past 24 hours:   Temp  Min: 97.7 °F (36.5 °C)  Max: 98 °F (36.7 °C)  BP  Min: 102/65  Max: 124/79  Pulse  Min: 67  Max: 80  Resp  Min: 16  Max: 18        General: Well developed, well nourished and in no acute distress   Abdomen: Soft, non-tender, no masses and fundus firm and non-tender   Pelvic: Not performed   Ext: Non-tender, 1+ pitting edema     Lab Results   Component Value Date    WBC 13.33 (H) 2024    HGB 10.7 (L) 2024    HCT 32.9 (L) 2024    MCV 90.6 2024     2024    RUBELLAABIGG 3.76 2023    HEPBSAG Negative 2023        Assessment & Plan    Postpartum Day #2 S/P spontaneous vaginal delivery  Continue routine postpartum care  Encourage ambulation  Breastfeeding support as needed  Plans POPs for contraception    Discharge today with 6 wk routine PP follow up.    Marcela Sharif MD  2024  08:46 EDT

## 2024-06-02 NOTE — DISCHARGE SUMMARY
Discharge Summary    [facility] 2005  MRN: [id number]  [facility]: [id number]    Date of Admission: 2024   Date of Discharge:  2024   Delivering Physician: Marcela Sharif        Admission Diagnosis: Intrauterine pregnancy at 37 weeks 5 days gestation  Prelabor rupture of membranes   Discharge Diagnosis: Same as above plus  Pregnancy at 37w5d - delivered       Procedures: 2024  - Vaginal, Spontaneous       Hospital Course  Patient is a 18 y.o.  who had a vaginal birth at 37w5d. Her postpartum course was without complications. On PPD # 2, she was ready for discharge. She was hemodynamically stable, had normal lochia, and her pain was well controlled with oral medications. She was breastfeeding without concern. She plans POPs for contraception.    Infant  female  fetus weighing 3140 g (6 lb 14.8 oz)   Apgars -  8 @ 1 minute /  9 @ 5 minutes.    Discharge Labs  Lab Results   Component Value Date    WBC 13.33 (H) 2024    HGB 10.7 (L) 2024    HCT 32.9 (L) 2024     2024     Discharge Medications     Discharge Medications        New Medications        Instructions Start Date   acetaminophen 500 MG tablet  Commonly known as: TYLENOL   1,000 mg, Oral, Every 6 Hours PRN      docusate sodium 100 MG capsule  Commonly known as: Colace   100 mg, Oral, 2 Times Daily      ibuprofen 800 MG tablet  Commonly known as: ADVIL,MOTRIN   800 mg, Oral, Every 6 Hours PRN      norethindrone 0.35 MG tablet  Commonly known as: MICRONOR   0.35 mg, Oral, Daily      polyethylene glycol 17 g packet  Commonly known as: MiraLax   17 g, Oral, Daily             Continue These Medications        Instructions Start Date   EPINEPHrine 0.3 MG/0.3ML solution auto-injector injection  Commonly known as: EPIPEN   AS DIRECTED FOR anaphylaxis      FLUoxetine 10 MG capsule  Commonly known as: PROzac   10 mg, Oral, Daily      FLUoxetine 20 MG capsule  Commonly known as: PROzac   20  mg, Oral, Every Morning      prenatal (CLASSIC) vitamin 28-0.8 MG tablet tablet  Generic drug: prenatal vitamin   1 tablet, Oral, Daily             Stop These Medications      famotidine 20 MG tablet  Commonly known as: PEPCID     vitamin B-6 50 MG tablet  Commonly known as: PYRIDOXINE              Discharge Disposition: Home or Self Care   Condition on Discharge: Good   Follow-up: 6 weeks with BALDO Sharif MD  6/2/2024

## 2024-06-02 NOTE — CASE MANAGEMENT/SOCIAL WORK
Case Management/Social Work    Patient Name:  Jaye Simon  YOB: 2005  MRN: [id number]  Admit Date:  5/31/2024      Sw notified by RN, pt had scored high on SDOH. Due to pt scoring high on several questions pertaining to different resources, Sw reached out to the CM for a SDOH packet.  At this time the secure chat has been sent to both the  and CM working today. Sw to follow.     08:33 EDT  , Kierra Huff, will deliver the packet. Once packet is delivered to pt, both mother and baby can dc to home.       Electronically signed by:  YENNY Azevedo  06/02/24 07:41 EDT
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
## 2024-06-20 ENCOUNTER — PATIENT OUTREACH (OUTPATIENT)
Dept: LABOR AND DELIVERY | Facility: HOSPITAL | Age: 19
End: 2024-06-20
Payer: COMMERCIAL

## 2024-06-20 NOTE — OUTREACH NOTE
Motherhood Connection  Postpartum Check-In    Questions/Answers      Flowsheet Row Responses   Lochia (per patient report) Brown-booker Red   Amount Scant   Number of pads per day 3   Lochia Odor Similar to menstrual flow   Postpartum Depression Screening Education Education Provided   Peripheral Blood Glucose Referred   Doctor Appointments: Education Provided   Breastfeeding Education Education Provided   Family Planning Education Education Provided   Postpartum Care Education Education Provided   S & S to report Education Provided   Followup Appointments Made Yes   Well Child Visit Appointments Made Yes   Did you complete the visit? Yes   Were there any specific concerns? Yes   Concerns: lip, cheek and tongue ties   Has additional follow-up with pediatrician or specialist been recommended? Yes   Follow-Up Recommended: OT, lactation   Umbilical Cord No reported signs or symptoms   Was the baby circumcised? No   Feeding Readiness Cues: Crying   Infant Feeding Method Breast, Formula, Expressed Breast Milk   Is a lactation referral indicated? Yes   Referral Comments lip, tongue and cheeck ties   Number of wet diapers x 24 hours 8   Last BM x 24 hours 4   Emesis (Unmeasured Occurence) 0   What safe sleep surface is available? Bassinet   Are there stuffed animals, toys, pillows, quilts, blankets, wedges, positioners, bumpers or other loose bedding in the infant's sleeping environment? No   Where does the baby usually sleep? Bassinet   Does the baby ever share a sleep surface with a sibling, adult or pet? No   Does the baby ever share a sleep surface in a bed, couch, recliner or other? No   What position do you place your baby to sleep for naps? Back   What position do you place your baby to sleep at night Back   Are you and/or other caregivers smoking inside or outside the baby's home? No   Is the infant dressed appropiately for the temperature of the home? Yes   Do you use a clean, dry pacifier that is not attached to a  string or stuffed animal? No            Review of Systems    Most Recent [place]  Depression Scale Score (EPDS)    Performed by a clinician: 7 (2024  6:21 PM)    Received via [employer] questionnaire:  ()     Hope and baby girl-Rema Mata states they are having trouble with latching while breastfeeding due to cheek, tongue and lip ties. Pt states the pediatrician referred them to OT specialist who also have lactation specialist and they are assisting baby to latch and eat. She is currently pumping and breastfeeding as well as supplimenting with formula as needed. Baby is otherwise doing well. Pt scored 7 on EPDS but denies therapy or medication assistance at this time. Spoke to pt about calling OBGYN if symptoms do not improve or worsen. Pt denies any SI/HI and states she feels she is just experiencing normal feeling tired. Pt denies excessive crying or mood changes, just low energy.     5 Ps Screen  Completed        Ashley OBREGON  Maternity Nurse Navigator    2024, 18:33 EDT

## 2024-06-27 ENCOUNTER — PATIENT OUTREACH (OUTPATIENT)
Dept: CALL CENTER | Facility: HOSPITAL | Age: 19
End: 2024-06-27
Payer: COMMERCIAL

## 2024-06-27 NOTE — OUTREACH NOTE
Motherhood Connection Survey      Flowsheet Row Responses   [facility] patient discharged from? [facility]   Week 1 attempt successful? Yes   Call start time 1206   Call end time 1213   Baby sex Girl    discharged home with mother? Yes   Baby sex Girl   Delivery type Vaginal   Emotional state Acceptance   Family support Yes   Do you have all necessary resources to care for you and your baby?  Yes   Have members of your household adjusted to your baby? Yes   Did you have any problems with pre-eclampsia during this pregnancy? No   Do you have any of the following: No Issues   Did you have blood glucose issues during this pregnancy No   Lochia amount None   Did you have an episiotomy/tear/abdominal incision? Yes   Feeding Method Combination   Frequency 2 times a day   Duration 15minutes   Pumping No   Supplementing Formula   Frequency every 3-4hrs   Amount 3.5-4ozs   Breast Condition No   Nipple Condition No   Number of wet diapers x 24 hours more than 6   Last BM x 24 hours 2-3   Umbilical Cord No reported signs or symptoms   Where does the baby usually sleep? Bassinet   Are there stuffed animals, toys, pillows, quilts, blankets, wedges, positioners, bumpers or other loose bedding in the infant's sleeping environment? No   Does the baby ever share a sleep surface in a bed, couch, recliner or other? No   What position do you lay your baby down to sleep? Back  [puts on back but goes to side most of the time]   Are you and/or other caregivers smoking inside or outside the baby's home? Yes   Mom appointment comments: 24   Baby appointment comments: 24   Additional comments latch issues d/t lip and cheek tie, infant goes to a lactation consultant at [facility] ped therapy   Call completed? Yes   How satisfied were you with the Motherhood Connection Program? 5   Anyone you would like to recognize from your time in the Motherhood Connection Program Amy Charles S - Registered Nurse

## 2024-07-16 NOTE — NURSING NOTE
Continue current medications  Restart Repatha once you get it. We will want to repeat your lipids 3 months after starting back on.Will be in touch with you after the results are available  Follow up in 1 year   Notified Марина Aden and inst her that lpatient had a high social determinants score and needed assistant. She stated she would contact case mgt and let them know and they would bring a packet to patient.

## 2024-07-18 ENCOUNTER — POSTPARTUM VISIT (OUTPATIENT)
Dept: OBSTETRICS AND GYNECOLOGY | Facility: CLINIC | Age: 19
End: 2024-07-18
Payer: COMMERCIAL

## 2024-07-18 VITALS
BODY MASS INDEX: 35.07 KG/M2 | HEIGHT: 62 IN | DIASTOLIC BLOOD PRESSURE: 78 MMHG | WEIGHT: 190.6 LBS | SYSTOLIC BLOOD PRESSURE: 112 MMHG

## 2024-07-18 DIAGNOSIS — Z30.011 ENCOUNTER FOR ORAL CONTRACEPTION INITIAL PRESCRIPTION: ICD-10-CM

## 2024-07-18 RX ORDER — NORGESTIMATE AND ETHINYL ESTRADIOL 0.25-0.035
1 KIT ORAL DAILY
Qty: 84 TABLET | Refills: 3 | Status: SHIPPED | OUTPATIENT
Start: 2024-07-18

## 2024-07-18 RX ORDER — NORGESTIMATE AND ETHINYL ESTRADIOL 0.25-0.035
1 KIT ORAL DAILY
COMMUNITY
End: 2024-07-18 | Stop reason: SDUPTHER

## 2024-07-18 NOTE — PROGRESS NOTES
"Postpartum Visit    Subjective   Chief Complaint   Patient presents with    Postpartum Care     6 week postpartum doing well.     Jaye Simon is a 18 y.o.  female who presents for postpartum care. She is status post  on 24 at 37w5d. She presented with PROM and underwent IOL. Her postpartum course was uncomplicated and she was discharged on PPD#2.    Her pain is well controlled. She is tolerating PO, voiding spontaneously and having normal bowel movements. She is bottle feeding without concern. Her mood has been stable on prozac. She has good support at home. Contraception: would like to start OCPs. She had her first menstrual cycle since delivery 1-2 weeks ago and denies concerns.       Objective   Vitals:    24 1022   BP: 112/78   Weight: 86.5 kg (190 lb 9.6 oz)   Height: 157.5 cm (62\")     Physical Exam:  Normal postpartum exam     Medical Decision Making:    I have reviewed the prenatal and postpartum labs. I have reviewed the delivery note and discharge summary.    Assessment & Plan       Diagnosis Plan   1. Postpartum follow-up        2. Encounter for oral contraception initial prescription  norgestimate-ethinyl estradiol (ORTHO-CYCLEN) 0.25-35 MG-MCG per tablet         Patient is meeting postpartum milestones  Bottle feeding without issue  Mood appropriate  Due for first Pap in 2024 - encouraged to schedule  Contraception: OCPs sent to pharmacy    RTC for annual with Pap in 2024    Marcela Sharif MD   Obstetrics and Gynecology  [facility]  "

## 2024-09-20 ENCOUNTER — APPOINTMENT (OUTPATIENT)
Dept: GENERAL RADIOLOGY | Facility: HOSPITAL | Age: 19
End: 2024-09-20
Payer: COMMERCIAL

## 2024-09-20 ENCOUNTER — HOSPITAL ENCOUNTER (EMERGENCY)
Facility: HOSPITAL | Age: 19
Discharge: HOME | End: 2024-09-20
Attending: EMERGENCY MEDICINE
Payer: COMMERCIAL

## 2024-09-20 VITALS
DIASTOLIC BLOOD PRESSURE: 62 MMHG | WEIGHT: 187 LBS | TEMPERATURE: 98.7 F | RESPIRATION RATE: 20 BRPM | SYSTOLIC BLOOD PRESSURE: 111 MMHG | HEART RATE: 67 BPM | OXYGEN SATURATION: 98 % | BODY MASS INDEX: 33.13 KG/M2 | HEIGHT: 63 IN

## 2024-09-20 DIAGNOSIS — J06.9 VIRAL URI WITH COUGH: Primary | ICD-10-CM

## 2024-09-20 LAB
FLUAV RNA RESP QL NAA+PROBE: NOT DETECTED
FLUBV RNA RESP QL NAA+PROBE: NOT DETECTED
RSV RNA RESP QL NAA+PROBE: NOT DETECTED
SARS-COV-2 RNA RESP QL NAA+PROBE: NOT DETECTED

## 2024-09-20 PROCEDURE — 25010000002 DEXAMETHASONE PER 1 MG: Performed by: EMERGENCY MEDICINE

## 2024-09-20 PROCEDURE — 87637 SARSCOV2&INF A&B&RSV AMP PRB: CPT | Performed by: EMERGENCY MEDICINE

## 2024-09-20 PROCEDURE — 71045 X-RAY EXAM CHEST 1 VIEW: CPT

## 2024-09-20 PROCEDURE — 99283 EMERGENCY DEPT VISIT LOW MDM: CPT

## 2024-09-20 RX ORDER — CODEINE PHOSPHATE AND GUAIFENESIN 10; 100 MG/5ML; MG/5ML
10 SOLUTION ORAL ONCE
Status: COMPLETED | OUTPATIENT
Start: 2024-09-20 | End: 2024-09-20

## 2024-09-20 RX ORDER — ALBUTEROL SULFATE 90 UG/1
2 INHALANT RESPIRATORY (INHALATION) ONCE
Status: COMPLETED | OUTPATIENT
Start: 2024-09-20 | End: 2024-09-20

## 2024-09-20 RX ORDER — AZITHROMYCIN 500 MG/1
500 TABLET, FILM COATED ORAL DAILY
Qty: 5 TABLET | Refills: 0 | Status: SHIPPED | OUTPATIENT
Start: 2024-09-22 | End: 2024-09-27

## 2024-09-20 RX ORDER — PREDNISONE 20 MG/1
40 TABLET ORAL DAILY
Qty: 10 TABLET | Refills: 0 | Status: SHIPPED | OUTPATIENT
Start: 2024-09-20 | End: 2024-09-25

## 2024-09-20 RX ORDER — CODEINE PHOSPHATE/GUAIFENESIN 10-100MG/5
5 LIQUID (ML) ORAL 3 TIMES DAILY PRN
Qty: 118 ML | Refills: 0 | Status: SHIPPED | OUTPATIENT
Start: 2024-09-20

## 2024-09-20 RX ADMIN — GUAIFENESIN AND CODEINE PHOSPHATE 10 ML: 100; 10 SOLUTION ORAL at 08:14

## 2024-09-20 RX ADMIN — DEXAMETHASONE SODIUM PHOSPHATE 8 MG: 10 INJECTION INTRAMUSCULAR; INTRAVENOUS at 08:14

## 2024-09-20 RX ADMIN — ALBUTEROL SULFATE 2 PUFF: 90 AEROSOL, METERED RESPIRATORY (INHALATION) at 08:14

## 2024-11-20 ENCOUNTER — OFFICE VISIT (OUTPATIENT)
Dept: OBSTETRICS AND GYNECOLOGY | Facility: CLINIC | Age: 19
End: 2024-11-20
Payer: COMMERCIAL

## 2024-11-20 VITALS
BODY MASS INDEX: 35.37 KG/M2 | DIASTOLIC BLOOD PRESSURE: 70 MMHG | WEIGHT: 199.6 LBS | HEIGHT: 63 IN | SYSTOLIC BLOOD PRESSURE: 100 MMHG

## 2024-11-20 DIAGNOSIS — N94.10 FEMALE DYSPAREUNIA: ICD-10-CM

## 2024-11-20 DIAGNOSIS — Z01.419 WELL WOMAN EXAM: Primary | ICD-10-CM

## 2024-11-20 DIAGNOSIS — Z30.41 ORAL CONTRACEPTIVE PILL SURVEILLANCE: ICD-10-CM

## 2024-11-20 RX ORDER — NORGESTIMATE AND ETHINYL ESTRADIOL 0.25-0.035
1 KIT ORAL DAILY
Qty: 84 TABLET | Refills: 3 | Status: SHIPPED | OUTPATIENT
Start: 2024-11-20
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
## 2024-11-22 LAB — REF LAB TEST METHOD: NORMAL

## 2025-04-13 ENCOUNTER — HOSPITAL ENCOUNTER (EMERGENCY)
Facility: HOSPITAL | Age: 20
Discharge: HOME | End: 2025-04-14
Attending: STUDENT IN AN ORGANIZED HEALTH CARE EDUCATION/TRAINING PROGRAM | Admitting: STUDENT IN AN ORGANIZED HEALTH CARE EDUCATION/TRAINING PROGRAM
Payer: COMMERCIAL

## 2025-04-13 DIAGNOSIS — R55 SYNCOPE AND COLLAPSE: Primary | ICD-10-CM

## 2025-04-13 LAB — GLUCOSE BLDC GLUCOMTR-MCNC: 105 MG/DL (ref 70–130)

## 2025-04-13 PROCEDURE — 85379 FIBRIN DEGRADATION QUANT: CPT | Performed by: STUDENT IN AN ORGANIZED HEALTH CARE EDUCATION/TRAINING PROGRAM

## 2025-04-13 PROCEDURE — 85025 COMPLETE CBC W/AUTO DIFF WBC: CPT | Performed by: STUDENT IN AN ORGANIZED HEALTH CARE EDUCATION/TRAINING PROGRAM

## 2025-04-13 PROCEDURE — 83605 ASSAY OF LACTIC ACID: CPT | Performed by: STUDENT IN AN ORGANIZED HEALTH CARE EDUCATION/TRAINING PROGRAM

## 2025-04-13 PROCEDURE — 99285 EMERGENCY DEPT VISIT HI MDM: CPT | Performed by: STUDENT IN AN ORGANIZED HEALTH CARE EDUCATION/TRAINING PROGRAM

## 2025-04-13 PROCEDURE — 84484 ASSAY OF TROPONIN QUANT: CPT | Performed by: STUDENT IN AN ORGANIZED HEALTH CARE EDUCATION/TRAINING PROGRAM

## 2025-04-13 PROCEDURE — 86140 C-REACTIVE PROTEIN: CPT | Performed by: STUDENT IN AN ORGANIZED HEALTH CARE EDUCATION/TRAINING PROGRAM

## 2025-04-13 PROCEDURE — 93005 ELECTROCARDIOGRAM TRACING: CPT | Performed by: STUDENT IN AN ORGANIZED HEALTH CARE EDUCATION/TRAINING PROGRAM

## 2025-04-13 PROCEDURE — 83735 ASSAY OF MAGNESIUM: CPT | Performed by: STUDENT IN AN ORGANIZED HEALTH CARE EDUCATION/TRAINING PROGRAM

## 2025-04-13 PROCEDURE — 80053 COMPREHEN METABOLIC PANEL: CPT | Performed by: STUDENT IN AN ORGANIZED HEALTH CARE EDUCATION/TRAINING PROGRAM

## 2025-04-13 PROCEDURE — 82948 REAGENT STRIP/BLOOD GLUCOSE: CPT

## 2025-04-13 RX ORDER — BUPROPION HYDROCHLORIDE 150 MG/1
300 TABLET ORAL DAILY
COMMUNITY

## 2025-04-13 RX ORDER — ONDANSETRON 2 MG/ML
4 INJECTION INTRAMUSCULAR; INTRAVENOUS ONCE
Status: COMPLETED | OUTPATIENT
Start: 2025-04-13 | End: 2025-04-14

## 2025-04-13 NOTE — Clinical Note
[facility] EMERGENCY DEPARTMENT  [address] [address]  Phone: [phone]    Jaye Simon was seen and treated in our emergency department on 4/13/2025.  She may return to work on 04/17/2025.         Thank you for choosing [facility].    Juan Carlos Crook MD

## 2025-04-13 NOTE — Clinical Note
[facility] EMERGENCY DEPARTMENT  [address] [address]  Phone: [phone]    Jaye Simon was seen and treated in our emergency department on 4/13/2025.  She may return to work on 04/17/2025.         Thank you for choosing [facility].    Juan Carlos Crook MD

## 2025-04-13 NOTE — Clinical Note
[facility] EMERGENCY DEPARTMENT  [address] [address]  Phone: [phone]    Jaye Simon was seen and treated in our emergency department on 4/13/2025.  She may return to work on 04/15/2025.         Thank you for choosing [facility].    Juan Carlos Crook MD

## 2025-04-14 ENCOUNTER — APPOINTMENT (OUTPATIENT)
Dept: CT IMAGING | Facility: HOSPITAL | Age: 20
End: 2025-04-14
Payer: COMMERCIAL

## 2025-04-14 ENCOUNTER — APPOINTMENT (OUTPATIENT)
Dept: GENERAL RADIOLOGY | Facility: HOSPITAL | Age: 20
End: 2025-04-14
Payer: COMMERCIAL

## 2025-04-14 VITALS
HEART RATE: 78 BPM | OXYGEN SATURATION: 96 % | SYSTOLIC BLOOD PRESSURE: 117 MMHG | BODY MASS INDEX: 35.35 KG/M2 | RESPIRATION RATE: 20 BRPM | HEIGHT: 63 IN | TEMPERATURE: 98.1 F | WEIGHT: 199.52 LBS | DIASTOLIC BLOOD PRESSURE: 63 MMHG

## 2025-04-14 LAB
ALBUMIN SERPL-MCNC: 4.3 G/DL (ref 3.5–5.2)
ALBUMIN/GLOB SERPL: 1.2 G/DL
ALP SERPL-CCNC: 101 U/L (ref 39–117)
ALT SERPL W P-5'-P-CCNC: 23 U/L (ref 1–33)
ANION GAP SERPL CALCULATED.3IONS-SCNC: 14.2 MMOL/L (ref 5–15)
AST SERPL-CCNC: 24 U/L (ref 1–32)
B-HCG UR QL: NEGATIVE
BASOPHILS # BLD AUTO: 0.09 10*3/MM3 (ref 0–0.2)
BASOPHILS NFR BLD AUTO: 0.8 % (ref 0–1.5)
BILIRUB SERPL-MCNC: 0.5 MG/DL (ref 0–1.2)
BILIRUB UR QL STRIP: NEGATIVE
BUN SERPL-MCNC: 13 MG/DL (ref 6–20)
BUN/CREAT SERPL: 15.7 (ref 7–25)
CALCIUM SPEC-SCNC: 9.5 MG/DL (ref 8.6–10.5)
CHLORIDE SERPL-SCNC: 102 MMOL/L (ref 98–107)
CLARITY UR: CLEAR
CO2 SERPL-SCNC: 22.8 MMOL/L (ref 22–29)
COLOR UR: YELLOW
CREAT SERPL-MCNC: 0.83 MG/DL (ref 0.57–1)
CRP SERPL-MCNC: 0.71 MG/DL (ref 0–0.5)
D DIMER PPP FEU-MCNC: 0.7 MCGFEU/ML (ref 0–0.5)
D-LACTATE SERPL-SCNC: 1.6 MMOL/L (ref 0.5–2)
DEPRECATED RDW RBC AUTO: 41.9 FL (ref 37–54)
EGFRCR SERPLBLD CKD-EPI 2021: 103 ML/MIN/1.73
EOSINOPHIL # BLD AUTO: 0.26 10*3/MM3 (ref 0–0.4)
EOSINOPHIL NFR BLD AUTO: 2.3 % (ref 0.3–6.2)
ERYTHROCYTE [DISTWIDTH] IN BLOOD BY AUTOMATED COUNT: 13.1 % (ref 12.3–15.4)
FLUAV RNA RESP QL NAA+PROBE: NOT DETECTED
FLUBV RNA RESP QL NAA+PROBE: NOT DETECTED
GEN 5 1HR TROPONIN T REFLEX: <6 NG/L
GLOBULIN UR ELPH-MCNC: 3.6 GM/DL
GLUCOSE SERPL-MCNC: 100 MG/DL (ref 65–99)
GLUCOSE UR STRIP-MCNC: NEGATIVE MG/DL
HCT VFR BLD AUTO: 42.1 % (ref 34–46.6)
HGB BLD-MCNC: 13.5 G/DL (ref 12–15.9)
HGB UR QL STRIP.AUTO: NEGATIVE
IMM GRANULOCYTES # BLD AUTO: 0.03 10*3/MM3 (ref 0–0.05)
IMM GRANULOCYTES NFR BLD AUTO: 0.3 % (ref 0–0.5)
KETONES UR QL STRIP: NEGATIVE
LEUKOCYTE ESTERASE UR QL STRIP.AUTO: NEGATIVE
LYMPHOCYTES # BLD AUTO: 3.51 10*3/MM3 (ref 0.7–3.1)
LYMPHOCYTES NFR BLD AUTO: 31.4 % (ref 19.6–45.3)
MAGNESIUM SERPL-MCNC: 2 MG/DL (ref 1.6–2.6)
MCH RBC QN AUTO: 28 PG (ref 26.6–33)
MCHC RBC AUTO-ENTMCNC: 32.1 G/DL (ref 31.5–35.7)
MCV RBC AUTO: 87.3 FL (ref 79–97)
MONOCYTES # BLD AUTO: 0.53 10*3/MM3 (ref 0.1–0.9)
MONOCYTES NFR BLD AUTO: 4.7 % (ref 5–12)
NEUTROPHILS NFR BLD AUTO: 6.75 10*3/MM3 (ref 1.7–7)
NEUTROPHILS NFR BLD AUTO: 60.5 % (ref 42.7–76)
NITRITE UR QL STRIP: NEGATIVE
NRBC BLD AUTO-RTO: 0 /100 WBC (ref 0–0.2)
PH UR STRIP.AUTO: 5.5 [PH] (ref 5–8)
PLATELET # BLD AUTO: 331 10*3/MM3 (ref 140–450)
PMV BLD AUTO: 9.7 FL (ref 6–12)
POTASSIUM SERPL-SCNC: 3.8 MMOL/L (ref 3.5–5.2)
PROT SERPL-MCNC: 7.9 G/DL (ref 6–8.5)
PROT UR QL STRIP: NEGATIVE
RBC # BLD AUTO: 4.82 10*6/MM3 (ref 3.77–5.28)
SARS-COV-2 RNA RESP QL NAA+PROBE: NOT DETECTED
SODIUM SERPL-SCNC: 139 MMOL/L (ref 136–145)
SP GR UR STRIP: 1.03 (ref 1–1.03)
TROPONIN T NUMERIC DELTA: NORMAL
TROPONIN T SERPL HS-MCNC: <6 NG/L
UROBILINOGEN UR QL STRIP: NORMAL
WBC NRBC COR # BLD AUTO: 11.17 10*3/MM3 (ref 3.4–10.8)

## 2025-04-14 PROCEDURE — 71275 CT ANGIOGRAPHY CHEST: CPT

## 2025-04-14 PROCEDURE — 81025 URINE PREGNANCY TEST: CPT | Performed by: STUDENT IN AN ORGANIZED HEALTH CARE EDUCATION/TRAINING PROGRAM

## 2025-04-14 PROCEDURE — 81003 URINALYSIS AUTO W/O SCOPE: CPT | Performed by: STUDENT IN AN ORGANIZED HEALTH CARE EDUCATION/TRAINING PROGRAM

## 2025-04-14 PROCEDURE — 96374 THER/PROPH/DIAG INJ IV PUSH: CPT

## 2025-04-14 PROCEDURE — 84484 ASSAY OF TROPONIN QUANT: CPT | Performed by: STUDENT IN AN ORGANIZED HEALTH CARE EDUCATION/TRAINING PROGRAM

## 2025-04-14 PROCEDURE — 36415 COLL VENOUS BLD VENIPUNCTURE: CPT

## 2025-04-14 PROCEDURE — 71045 X-RAY EXAM CHEST 1 VIEW: CPT

## 2025-04-14 PROCEDURE — 87636 SARSCOV2 & INF A&B AMP PRB: CPT | Performed by: STUDENT IN AN ORGANIZED HEALTH CARE EDUCATION/TRAINING PROGRAM

## 2025-04-14 PROCEDURE — 25810000003 SODIUM CHLORIDE 0.9 % SOLUTION: Performed by: STUDENT IN AN ORGANIZED HEALTH CARE EDUCATION/TRAINING PROGRAM

## 2025-04-14 PROCEDURE — 25010000002 ONDANSETRON PER 1 MG: Performed by: STUDENT IN AN ORGANIZED HEALTH CARE EDUCATION/TRAINING PROGRAM

## 2025-04-14 PROCEDURE — 25510000001 IOPAMIDOL 61 % SOLUTION: Performed by: STUDENT IN AN ORGANIZED HEALTH CARE EDUCATION/TRAINING PROGRAM

## 2025-04-14 RX ORDER — ONDANSETRON 4 MG/1
4 TABLET, ORALLY DISINTEGRATING ORAL EVERY 8 HOURS PRN
Qty: 20 TABLET | Refills: 0 | Status: SHIPPED | OUTPATIENT
Start: 2025-04-14

## 2025-04-14 RX ORDER — IOPAMIDOL 612 MG/ML
80 INJECTION, SOLUTION INTRAVASCULAR
Status: COMPLETED | OUTPATIENT
Start: 2025-04-14 | End: 2025-04-14

## 2025-04-14 RX ADMIN — ONDANSETRON 4 MG: 2 INJECTION INTRAMUSCULAR; INTRAVENOUS at 00:00

## 2025-04-14 RX ADMIN — SODIUM CHLORIDE 1000 ML: 9 INJECTION, SOLUTION INTRAVENOUS at 00:00

## 2025-04-14 RX ADMIN — IOPAMIDOL 80 ML: 612 INJECTION, SOLUTION INTRAVENOUS at 01:10
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
## 2025-04-14 NOTE — DISCHARGE INSTRUCTIONS
You were evaluated after passing out.  We got lab work chest x-ray and a CT scan of your chest that showed no concerns for any problems with your heart or blood clots.  We gave you IV fluids and Zofran you are now stable for discharge.  We have prescribed you Zofran to help with nausea vomiting at home and recommend following with your primary care doctor to ensure that you improve appropriately.  If you have severe increase in chest pain shortness of breath or further episodes of passing out please come back to the to the emergency department for further evaluation.  You are now stable for discharge.

## 2025-07-03 ENCOUNTER — HOSPITAL ENCOUNTER (EMERGENCY)
Facility: HOSPITAL | Age: 20
Discharge: HOME | End: 2025-07-03
Attending: STUDENT IN AN ORGANIZED HEALTH CARE EDUCATION/TRAINING PROGRAM
Payer: COMMERCIAL

## 2025-07-03 VITALS
HEART RATE: 67 BPM | WEIGHT: 199.52 LBS | RESPIRATION RATE: 18 BRPM | HEIGHT: 63 IN | TEMPERATURE: 98.1 F | SYSTOLIC BLOOD PRESSURE: 118 MMHG | DIASTOLIC BLOOD PRESSURE: 78 MMHG | BODY MASS INDEX: 35.35 KG/M2 | OXYGEN SATURATION: 100 %

## 2025-07-03 DIAGNOSIS — T78.40XA ALLERGIC REACTION, INITIAL ENCOUNTER: ICD-10-CM
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
## 2025-07-03 PROCEDURE — 99283 EMERGENCY DEPT VISIT LOW MDM: CPT | Performed by: STUDENT IN AN ORGANIZED HEALTH CARE EDUCATION/TRAINING PROGRAM

## 2025-07-03 PROCEDURE — 25010000002 FAMOTIDINE 10 MG/ML SOLUTION

## 2025-07-03 PROCEDURE — 96374 THER/PROPH/DIAG INJ IV PUSH: CPT

## 2025-07-03 PROCEDURE — 25010000002 DIPHENHYDRAMINE PER 50 MG

## 2025-07-03 PROCEDURE — 25010000002 METHYLPREDNISOLONE PER 125 MG

## 2025-07-03 PROCEDURE — 96375 TX/PRO/DX INJ NEW DRUG ADDON: CPT

## 2025-07-03 RX ORDER — METHYLPREDNISOLONE SODIUM SUCCINATE 125 MG/2ML
125 INJECTION, POWDER, LYOPHILIZED, FOR SOLUTION INTRAMUSCULAR; INTRAVENOUS ONCE
Status: COMPLETED | OUTPATIENT
Start: 2025-07-03 | End: 2025-07-03

## 2025-07-03 RX ORDER — DIPHENHYDRAMINE HYDROCHLORIDE 50 MG/ML
25 INJECTION, SOLUTION INTRAMUSCULAR; INTRAVENOUS ONCE
Status: COMPLETED | OUTPATIENT
Start: 2025-07-03 | End: 2025-07-03

## 2025-07-03 RX ORDER — EPINEPHRINE 0.3 MG/.3ML
0.3 INJECTION SUBCUTANEOUS ONCE
Qty: 1 EACH | Refills: 0 | Status: SHIPPED | OUTPATIENT
Start: 2025-07-03 | End: 2025-07-03

## 2025-07-03 RX ORDER — DIPHENHYDRAMINE HCL 50 MG
50 CAPSULE ORAL EVERY 6 HOURS PRN
Qty: 30 CAPSULE | Refills: 0 | Status: SHIPPED | OUTPATIENT
Start: 2025-07-03

## 2025-07-03 RX ORDER — FAMOTIDINE 20 MG/1
20 TABLET, FILM COATED ORAL 2 TIMES DAILY
Qty: 14 TABLET | Refills: 0 | Status: SHIPPED | OUTPATIENT
Start: 2025-07-03 | End: 2025-07-10

## 2025-07-03 RX ORDER — SODIUM CHLORIDE 0.9 % (FLUSH) 0.9 %
10 SYRINGE (ML) INJECTION AS NEEDED
Status: DISCONTINUED | OUTPATIENT
Start: 2025-07-03 | End: 2025-07-03 | Stop reason: HOSPADM

## 2025-07-03 RX ORDER — PREDNISONE 50 MG/1
50 TABLET ORAL DAILY
Qty: 5 TABLET | Refills: 0 | Status: SHIPPED | OUTPATIENT
Start: 2025-07-03 | End: 2025-07-08

## 2025-07-03 RX ORDER — FAMOTIDINE 10 MG/ML
20 INJECTION, SOLUTION INTRAVENOUS ONCE
Status: COMPLETED | OUTPATIENT
Start: 2025-07-03 | End: 2025-07-03

## 2025-07-03 RX ADMIN — METHYLPREDNISOLONE SODIUM SUCCINATE 125 MG: 125 INJECTION, POWDER, FOR SOLUTION INTRAMUSCULAR; INTRAVENOUS at 15:58

## 2025-07-03 RX ADMIN — DIPHENHYDRAMINE HYDROCHLORIDE 25 MG: 50 INJECTION, SOLUTION INTRAMUSCULAR; INTRAVENOUS at 15:56

## 2025-07-03 RX ADMIN — FAMOTIDINE 20 MG: 10 INJECTION, SOLUTION INTRAVENOUS at 16:01

## 2025-07-03 NOTE — DISCHARGE INSTRUCTIONS
We have provided you a refill for your EpiPen as well as a course of prednisone Pepcid and Benadryl medications which you should start tomorrow and take as prescribed until gone.  We recommend following up with your primary care doctor.  Return to the ER for any swelling of the lips tongue or throat, difficulty breathing or swallowing, or for any changes or worsening of condition right away.
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
## 2025-07-23 ENCOUNTER — HOSPITAL ENCOUNTER (EMERGENCY)
Facility: HOSPITAL | Age: 20
Discharge: HOME | End: 2025-07-23
Attending: EMERGENCY MEDICINE | Admitting: EMERGENCY MEDICINE
Payer: COMMERCIAL

## 2025-07-23 ENCOUNTER — APPOINTMENT (OUTPATIENT)
Dept: GENERAL RADIOLOGY | Facility: HOSPITAL | Age: 20
End: 2025-07-23
Payer: COMMERCIAL

## 2025-07-23 VITALS
DIASTOLIC BLOOD PRESSURE: 84 MMHG | HEART RATE: 60 BPM | SYSTOLIC BLOOD PRESSURE: 108 MMHG | RESPIRATION RATE: 16 BRPM | OXYGEN SATURATION: 98 % | HEIGHT: 63 IN | BODY MASS INDEX: 33.66 KG/M2 | TEMPERATURE: 97.7 F | WEIGHT: 190 LBS

## 2025-07-23 DIAGNOSIS — M25.511 ACUTE PAIN OF RIGHT SHOULDER: Primary | ICD-10-CM

## 2025-07-23 PROCEDURE — 73030 X-RAY EXAM OF SHOULDER: CPT

## 2025-07-23 PROCEDURE — 96372 THER/PROPH/DIAG INJ SC/IM: CPT

## 2025-07-23 PROCEDURE — 99283 EMERGENCY DEPT VISIT LOW MDM: CPT | Performed by: EMERGENCY MEDICINE

## 2025-07-23 PROCEDURE — 25010000002 KETOROLAC TROMETHAMINE PER 15 MG

## 2025-07-23 RX ORDER — KETOROLAC TROMETHAMINE 30 MG/ML
30 INJECTION, SOLUTION INTRAMUSCULAR; INTRAVENOUS ONCE
Status: COMPLETED | OUTPATIENT
Start: 2025-07-23 | End: 2025-07-23

## 2025-07-23 RX ORDER — MELOXICAM 7.5 MG/1
15 TABLET ORAL DAILY
Qty: 14 TABLET | Refills: 0 | Status: SHIPPED | OUTPATIENT
Start: 2025-07-23 | End: 2025-07-30

## 2025-07-23 RX ORDER — CYCLOBENZAPRINE HCL 10 MG
10 TABLET ORAL 3 TIMES DAILY PRN
Qty: 12 TABLET | Refills: 0 | Status: SHIPPED | OUTPATIENT
Start: 2025-07-23

## 2025-07-23 RX ADMIN — KETOROLAC TROMETHAMINE 30 MG: 30 INJECTION INTRAMUSCULAR; INTRAVENOUS at 12:40
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
## 2025-08-12 ENCOUNTER — OFFICE VISIT (OUTPATIENT)
Dept: ORTHOPEDIC SURGERY | Facility: CLINIC | Age: 20
End: 2025-08-12
Payer: COMMERCIAL

## 2025-08-12 VITALS
SYSTOLIC BLOOD PRESSURE: 130 MMHG | HEIGHT: 63 IN | WEIGHT: 196 LBS | DIASTOLIC BLOOD PRESSURE: 82 MMHG | BODY MASS INDEX: 34.73 KG/M2

## 2025-08-12 DIAGNOSIS — S16.1XXA CERVICAL MYOFASCIAL STRAIN, INITIAL ENCOUNTER: Primary | ICD-10-CM

## 2025-08-12 RX ORDER — BUPROPION HYDROCHLORIDE 300 MG/1
300 TABLET ORAL DAILY
COMMUNITY

## 2025-08-12 RX ORDER — EPINEPHRINE 0.3 MG/.3ML
INJECTION SUBCUTANEOUS
COMMUNITY
Start: 2025-07-05

## 2025-08-12 RX ORDER — SULFAMETHOXAZOLE AND TRIMETHOPRIM 800; 160 MG/1; MG/1
TABLET ORAL
COMMUNITY
Start: 2025-08-08

## 2025-08-18 ENCOUNTER — PATIENT ROUNDING (BHMG ONLY) (OUTPATIENT)
Dept: ORTHOPEDIC SURGERY | Facility: CLINIC | Age: 20
End: 2025-08-18
Payer: COMMERCIAL